# Patient Record
Sex: FEMALE | Race: WHITE | NOT HISPANIC OR LATINO | Employment: UNEMPLOYED | URBAN - METROPOLITAN AREA
[De-identification: names, ages, dates, MRNs, and addresses within clinical notes are randomized per-mention and may not be internally consistent; named-entity substitution may affect disease eponyms.]

---

## 2024-01-29 PROBLEM — E66.812 CLASS 2 OBESITY: Status: ACTIVE | Noted: 2024-01-29

## 2024-06-06 ENCOUNTER — TELEPHONE (OUTPATIENT)
Dept: SLEEP MEDICINE | Facility: HOSPITAL | Age: 59
End: 2024-06-06
Payer: COMMERCIAL

## 2024-06-06 ENCOUNTER — OFFICE VISIT (OUTPATIENT)
Dept: FAMILY MEDICINE CLINIC | Facility: CLINIC | Age: 59
End: 2024-06-06
Payer: COMMERCIAL

## 2024-06-06 VITALS
TEMPERATURE: 98.4 F | BODY MASS INDEX: 39.41 KG/M2 | HEART RATE: 86 BPM | HEIGHT: 66 IN | SYSTOLIC BLOOD PRESSURE: 118 MMHG | OXYGEN SATURATION: 99 % | DIASTOLIC BLOOD PRESSURE: 74 MMHG | WEIGHT: 245.2 LBS

## 2024-06-06 DIAGNOSIS — Z76.89 ENCOUNTER FOR SUPPORT AND COORDINATION OF TRANSITION OF CARE: Primary | ICD-10-CM

## 2024-06-06 DIAGNOSIS — Z00.00 ANNUAL PHYSICAL EXAM: ICD-10-CM

## 2024-06-06 DIAGNOSIS — R06.09 DYSPNEA ON EXERTION: ICD-10-CM

## 2024-06-06 DIAGNOSIS — Z12.31 ENCOUNTER FOR SCREENING MAMMOGRAM FOR MALIGNANT NEOPLASM OF BREAST: ICD-10-CM

## 2024-06-06 DIAGNOSIS — R60.0 EDEMA OF BOTH FEET: ICD-10-CM

## 2024-06-06 DIAGNOSIS — Z23 NEED FOR SHINGLES VACCINE: ICD-10-CM

## 2024-06-06 DIAGNOSIS — Z83.49 FAMILY HISTORY OF THYROID DISEASE: ICD-10-CM

## 2024-06-06 DIAGNOSIS — F51.04 CHRONIC INSOMNIA: ICD-10-CM

## 2024-06-06 DIAGNOSIS — F51.01 PRIMARY INSOMNIA: ICD-10-CM

## 2024-06-06 DIAGNOSIS — I51.89 DIASTOLIC DYSFUNCTION: ICD-10-CM

## 2024-06-06 DIAGNOSIS — Z51.81 MEDICATION MONITORING ENCOUNTER: ICD-10-CM

## 2024-06-06 DIAGNOSIS — I10 PRIMARY HYPERTENSION: ICD-10-CM

## 2024-06-06 DIAGNOSIS — R63.5 WEIGHT GAIN: ICD-10-CM

## 2024-06-06 LAB
AMPHET+METHAMPHET UR QL: NEGATIVE
AMPHETAMINE INTERNAL CONTROL: ABNORMAL
AMPHETAMINES UR QL: NEGATIVE
BARBITURATE INTERNAL CONTROL: ABNORMAL
BARBITURATES UR QL SCN: NEGATIVE
BENZODIAZ UR QL SCN: POSITIVE
BENZODIAZEPINE INTERNAL CONTROL: ABNORMAL
BUPRENORPHINE INTERNAL CONTROL: ABNORMAL
BUPRENORPHINE SERPL-MCNC: NEGATIVE NG/ML
CANNABINOIDS SERPL QL: NEGATIVE
COCAINE INTERNAL CONTROL: ABNORMAL
COCAINE UR QL: NEGATIVE
EXPIRATION DATE: ABNORMAL
Lab: ABNORMAL
MDMA (ECSTASY) INTERNAL CONTROL: ABNORMAL
MDMA UR QL SCN: NEGATIVE
METHADONE INTERNAL CONTROL: ABNORMAL
METHADONE UR QL SCN: NEGATIVE
METHAMPHETAMINE INTERNAL CONTROL: ABNORMAL
MORPHINE INTERNAL CONTROL: ABNORMAL
MORPHINE/OPIATES SCREEN, URINE: NEGATIVE
OXYCODONE INTERNAL CONTROL: ABNORMAL
OXYCODONE UR QL SCN: NEGATIVE
PCP UR QL SCN: NEGATIVE
PHENCYCLIDINE INTERNAL CONTROL: ABNORMAL
THC INTERNAL CONTROL: ABNORMAL

## 2024-06-06 PROCEDURE — 99396 PREV VISIT EST AGE 40-64: CPT

## 2024-06-06 PROCEDURE — 99214 OFFICE O/P EST MOD 30 MIN: CPT

## 2024-06-06 RX ORDER — TEMAZEPAM 15 MG/1
15 CAPSULE ORAL NIGHTLY
Qty: 30 CAPSULE | Refills: 2 | Status: SHIPPED | OUTPATIENT
Start: 2024-06-06

## 2024-06-06 RX ORDER — HYDROCHLOROTHIAZIDE 12.5 MG/1
12.5 TABLET ORAL DAILY
Qty: 90 TABLET | Refills: 0 | Status: SHIPPED | OUTPATIENT
Start: 2024-06-06

## 2024-06-06 NOTE — PROGRESS NOTES
Chief Complaint  Chief Complaint   Patient presents with    [facility] [place] Care    Annual Exam       Subjective      Carlota RODRIGUEZ presents to [facility]  History of Present Illness  The patient presents for evaluation of multiple medical concerns.    The patient has recently transitioned to [facility]. She has a medical history of hypertension and is currently on losartan 75 mg (taking a 50 mg and 25 mg tablet). She occasionally experiences dyspnea on exertion, which she attributes to her weight. Her mother has observed her breathing heavily during exertion. She has not utilized a CPAP machine for several years, but previously required it due to sleep disturbances. Her sleep quality has improved since discontinuing the CPAP. She underwent a sleep study in 10/2023 or 11/2023, which reportedly yielded normal results. She reports significant ankle edema, which intensifies when she consumes salty foods, leading her to avoid them. The swelling worsens at the end of the day, but decreases when she reduces her salt intake and elevates her legs. She underwent an echocardiogram and a Holter monitor at the end of 2020, both of which returned normal results. She has tried Weight Watchers and other weight loss methods, but these have not yielded positive results. She experienced a weight loss of 10 pounds during a divorce, from 250 pounds to 240 pounds, but has since regained some weight. She underwent cardiac testing due to chest pain and palpitations, which were normal. She takes Wellbutrin 1 tablet daily for depression and anxiety, but has not noticed a change in her appetite since starting Wellbutrin. She is unable to take Topamax due to a rash. She has no history of diabetes. She is due for a mammogram.   She works for [employer] and works from home for 20 hours a week.    Her mother retains a lot of water and has to take medicine for that. Her mother is going back to the      Objective     Medical History:  Past Medical History:   Diagnosis Date    Anemia     Anxiety     Asthma     Cholelithiasis     Had Gallbladder Removed    CPAP (continuous positive airway pressure) dependence     Depression     Eating disorder     Overweight    H/O Lymphedema     Left arm and leg    History of anemia     History of foreign travel     April and Luke    History of hypoglycemia     History of vitamin D deficiency     Hypertension     Knee meniscus pain, left     Migraine     OAB (overactive bladder)     Obesity     RICHARD (obstructive sleep apnea)     Poor vision      Past Surgical History:   Procedure Laterality Date     SECTION      CHOLECYSTECTOMY      COLONOSCOPY N/A 2023    Procedure: COLONOSCOPY with stool collection;  Surgeon: Leobardo Hodgson MD;  Location: [facility] ENDOSCOPY;  Service: General;  Laterality: N/A;  internal hemmorhoids    DILATATION AND CURETTAGE      ENDOMETRIAL ABLATION      HEMORRHOIDECTOMY  2006    LYMPH NODE DISSECTION      NECK SURGERY  1964    ROTATOR CUFF REPAIR Right 2010    SHOULDER SURGERY Left 2018      Social History     Tobacco Use    Smoking status: Never     Passive exposure: Never    Smokeless tobacco: Never   Vaping Use    Vaping status: Never Used   Substance Use Topics    Alcohol use: No    Drug use: No     Family History   Problem Relation Age of Onset    Clotting disorder Father     Hearing loss Father         Wore a Hearing Aid    Diabetes Mother     Skin cancer Mother     Anxiety disorder Mother         Anxiety    Arthritis Mother         Rheumatoid    Asthma Mother     Depression Mother     Hyperlipidemia Mother     Hearing loss Mother         Wears a Hearing Aid    Hypertension Mother     Anxiety disorder Maternal Grandmother         Anxiety    Diabetes Maternal Aunt     Breast cancer Maternal Aunt     Breast cancer Maternal Aunt     Anxiety disorder Maternal Aunt         Anxiety    Diabetes Maternal Aunt     Hyperlipidemia  Maternal Aunt     Cancer Maternal Aunt         Breast    Anxiety disorder Maternal Aunt         Anxiety    Hyperlipidemia Maternal Aunt     Uterine cancer Maternal Aunt     Cancer Maternal Aunt         Generalized    Hyperlipidemia Maternal Uncle     Diabetes Other     Hyperlipidemia Other     Hypertension Other     Lung cancer Other     Diabetes Paternal Grandmother        Medications:  Prior to Admission medications    Medication Sig Start Date End Date Taking? Authorizing Provider   buPROPion XL (WELLBUTRIN XL) 150 MG 24 hr tablet Take 1 tablet by mouth Daily. 5/1/24  Yes Betzy Cabral APRN   hydrocortisone 2.5 % cream Apply 1 application  topically to the appropriate area as directed 2 (Two) Times a Day.  Patient taking differently: Apply 1 Application topically to the appropriate area as directed As Needed. 11/17/23  Yes Bernardino Boland MD   losartan (COZAAR) 25 MG tablet Take 1 tablet by mouth Daily for 180 days. 5/1/24 10/28/24 Yes Betzy Cabral APRN   losartan (COZAAR) 50 MG tablet Take 1 tablet by mouth Daily for 180 days. 5/1/24 10/28/24 Yes Betzy Cabral APRN   temazepam (RESTORIL) 15 MG capsule Take 1 capsule by mouth Every Night. 1/29/24  Yes Carl Darling MD   triamcinolone (KENALOG) 0.1 % ointment APPLY TOPICALLY TO THE AFFECTED AREA TWICE DAILY AS DIRECTED 3/21/24  Yes Yue Pena APRN   meloxicam (MOBIC) 15 MG tablet TAKE 1 TABLET BY MOUTH DAILY  Patient not taking: Reported on 6/6/2024 5/17/24   Bernardino Boland MD   lisinopril (PRINIVIL,ZESTRIL) 10 MG tablet Take 1 tablet by mouth Daily. 8/24/21 5/9/22  Ruchi Bradford APRN        Allergies:   Codeine, Demerol [meperidine], Hydrocodone-acetaminophen, Percocet [oxycodone-acetaminophen], Topiramate, Tramadol, and Tylenol [acetaminophen]    Health Maintenance Due   Topic Date Due    ZOSTER VACCINE (2 of 3) 08/01/2014    ANNUAL PHYSICAL  Never done         Vital Signs:   /74 (BP Location:  "Left arm, Patient Position: Sitting, Cuff Size: Adult)   Pulse 86   Temp 98.4 °F (36.9 °C)   Ht 167.6 cm (65.98\")   Wt 111 kg (245 lb 3.2 oz)   SpO2 99%   BMI 39.60 kg/m²     Wt Readings from Last 3 Encounters:   06/06/24 111 kg (245 lb 3.2 oz)   04/10/24 109 kg (240 lb)   02/28/24 114 kg (252 lb 6.4 oz)     BP Readings from Last 3 Encounters:   06/06/24 118/74   04/10/24 134/82   02/28/24 137/81     Physical Exam  Vitals reviewed.   Constitutional:       Appearance: Normal appearance. She is well-developed. She is obese.   HENT:      Head: Normocephalic and atraumatic.   Eyes:      Conjunctiva/sclera: Conjunctivae normal.      Pupils: Pupils are equal, round, and reactive to light.   Cardiovascular:      Rate and Rhythm: Normal rate and regular rhythm.      Heart sounds: No murmur heard.     No friction rub. No gallop.      Comments: Trace edema to lower extremities, no pitting edema noted on today's exam.  Pulmonary:      Effort: Pulmonary effort is normal.      Breath sounds: Normal breath sounds. No wheezing or rhonchi.   Abdominal:      General: Bowel sounds are normal. There is no distension.      Palpations: Abdomen is soft.      Tenderness: There is no abdominal tenderness.   Skin:     General: Skin is warm and dry.   Neurological:      Mental Status: She is alert and oriented to person, place, and time.      Cranial Nerves: No cranial nerve deficit.   Psychiatric:         Mood and Affect: Mood and affect normal.         Behavior: Behavior normal.         Thought Content: Thought content normal.         Judgment: Judgment normal.       Physical Exam  Lungs sound good. No wheezes or crackles in the bases.      Result Review :    The following data was reviewed by ARVIND Cruz on 06/06/24 at 11:51 EDT:               Results                 Assessment and Plan    Diagnoses and all orders for this visit:    1. Encounter for support and coordination of transition of care (Primary)    2. " Annual physical exam  -     CBC & Differential  -     Comprehensive Metabolic Panel  -     Lipid Panel  -     TSH Rfx On Abnormal To Free T4    3. Dyspnea on exertion    4. Edema of both feet  -     hydroCHLOROthiazide 12.5 MG tablet; Take 1 tablet by mouth Daily.  Dispense: 90 tablet; Refill: 0    5. Diastolic dysfunction    6. Family history of thyroid disease  -     TSH Rfx On Abnormal To Free T4  -     Thyroid Peroxidase Antibody    7. Weight gain    8. Primary hypertension  -     hydroCHLOROthiazide 12.5 MG tablet; Take 1 tablet by mouth Daily.  Dispense: 90 tablet; Refill: 0    9. Encounter for screening mammogram for malignant neoplasm of breast  -     Mammo Screening Digital Tomosynthesis Bilateral With CAD; Future    10. Need for shingles vaccine    11. Primary insomnia  -     POC Medline 12 Panel Urine Drug Screen    12. Medication monitoring encounter  -     POC Medline 12 Panel Urine Drug Screen    13. Chronic insomnia  -     temazepam (RESTORIL) 15 MG capsule; Take 1 capsule by mouth Every Night.  Dispense: 30 capsule; Refill: 2       Assessment & Plan  1. Hypertension/edema.  A comprehensive set of labs will be conducted to assess kidney and liver function, blood counts, thyroid function, and cholesterol levels. Hydrochlorothiazide 12.5 mg once daily will be initiated for management of edema and hypertension.  Patient encouraged to monitor her blood pressure regularly at home to ensure that the addition of HCTZ does not cause her blood pressure to drop significantly.  If so, losartan may need to be decreased to 50 mg daily.    2.  Insomnia  Patient to continue temazepam as needed for insomnia.  A urine drug screen and controlled substance agreement form will be obtained today.    3.  Weight gain/obesity  Lower panel to be obtained today to rule out underlying thyroid disease.  Discussed multiple weight loss medications.  Phentermine will be contraindicated given history of hypertension, grade 1  diastolic dysfunction.  Patient is on Wellbutrin 150 mg daily now for mood.  She has a contraindication to Topamax as she had an adverse reaction when she previously took this for headaches.  Discussed injectable medications such as GLP-1's and patient is to contact her insurance to determine if they cover any weight loss medications.    4.  Health maintenance  A mammogram will be ordered.  Patient encouraged to receive RSV and shingles vaccines at her pharmacy.    Risk and benefits for use of benzodiazepines were discussed with the patient including risk of dependence/abuse.  Adverse effects discussed with patient including drowsiness and decreased respiratory drive especially when combining with alcohol and/or opiates need for routine monitoring discussed.    Follow-up  A follow-up appointment is scheduled for one month from now to monitor blood pressure and edema with initiation of HCTZ.          Smoking Cessation:    Carlota RODRIGUEZ  reports that she has never smoked. She has never been exposed to tobacco smoke. She has never used smokeless tobacco.          Follow Up   Return in about 1 month (around 7/6/2024).  Patient was given instructions and counseling regarding her condition or for health maintenance advice. Please see specific information pulled into the AVS if appropriate.     Please note that portions of this note were completed with a voice recognition program.    Patient or patient representative verbalized consent for the use of Ambient Listening during the visit with  ARVIND Cruz for chart documentation. 6/6/2024  11:47 EDT

## 2024-06-07 ENCOUNTER — CLINICAL SUPPORT (OUTPATIENT)
Dept: FAMILY MEDICINE CLINIC | Facility: CLINIC | Age: 59
End: 2024-06-07
Payer: COMMERCIAL

## 2024-06-07 DIAGNOSIS — I10 PRIMARY HYPERTENSION: Primary | ICD-10-CM

## 2024-06-07 LAB
ALBUMIN SERPL-MCNC: 4.2 G/DL (ref 3.5–5.2)
ALBUMIN/GLOB SERPL: 1.6 G/DL
ALP SERPL-CCNC: 119 U/L (ref 39–117)
ALT SERPL W P-5'-P-CCNC: 24 U/L (ref 1–33)
ANION GAP SERPL CALCULATED.3IONS-SCNC: 11 MMOL/L (ref 5–15)
AST SERPL-CCNC: 19 U/L (ref 1–32)
BASOPHILS # BLD AUTO: 0.08 10*3/MM3 (ref 0–0.2)
BASOPHILS NFR BLD AUTO: 1.6 % (ref 0–1.5)
BILIRUB SERPL-MCNC: 0.3 MG/DL (ref 0–1.2)
BUN SERPL-MCNC: 14 MG/DL (ref 8–23)
BUN/CREAT SERPL: 14.6 (ref 7–25)
CALCIUM SPEC-SCNC: 9.2 MG/DL (ref 8.6–10.5)
CHLORIDE SERPL-SCNC: 107 MMOL/L (ref 98–107)
CHOLEST SERPL-MCNC: 204 MG/DL (ref 0–200)
CO2 SERPL-SCNC: 21 MMOL/L (ref 22–29)
CREAT SERPL-MCNC: 0.96 MG/DL (ref 0.57–1)
DEPRECATED RDW RBC AUTO: 38.9 FL (ref 37–54)
EGFRCR SERPLBLD CKD-EPI 2021: 67.9 ML/MIN/1.73
EOSINOPHIL # BLD AUTO: 0.13 10*3/MM3 (ref 0–0.4)
EOSINOPHIL NFR BLD AUTO: 2.7 % (ref 0.3–6.2)
ERYTHROCYTE [DISTWIDTH] IN BLOOD BY AUTOMATED COUNT: 12.5 % (ref 12.3–15.4)
GLOBULIN UR ELPH-MCNC: 2.7 GM/DL
GLUCOSE SERPL-MCNC: 92 MG/DL (ref 65–99)
HCT VFR BLD AUTO: 39.3 % (ref 34–46.6)
HDLC SERPL-MCNC: 65 MG/DL (ref 40–60)
HGB BLD-MCNC: 12.9 G/DL (ref 12–15.9)
IMM GRANULOCYTES # BLD AUTO: 0.01 10*3/MM3 (ref 0–0.05)
IMM GRANULOCYTES NFR BLD AUTO: 0.2 % (ref 0–0.5)
LDLC SERPL CALC-MCNC: 116 MG/DL (ref 0–100)
LDLC/HDLC SERPL: 1.74 {RATIO}
LYMPHOCYTES # BLD AUTO: 1.79 10*3/MM3 (ref 0.7–3.1)
LYMPHOCYTES NFR BLD AUTO: 36.7 % (ref 19.6–45.3)
MCH RBC QN AUTO: 28.2 PG (ref 26.6–33)
MCHC RBC AUTO-ENTMCNC: 32.8 G/DL (ref 31.5–35.7)
MCV RBC AUTO: 86 FL (ref 79–97)
MONOCYTES # BLD AUTO: 0.39 10*3/MM3 (ref 0.1–0.9)
MONOCYTES NFR BLD AUTO: 8 % (ref 5–12)
NEUTROPHILS NFR BLD AUTO: 2.48 10*3/MM3 (ref 1.7–7)
NEUTROPHILS NFR BLD AUTO: 50.8 % (ref 42.7–76)
NRBC BLD AUTO-RTO: 0 /100 WBC (ref 0–0.2)
PLATELET # BLD AUTO: 271 10*3/MM3 (ref 140–450)
PMV BLD AUTO: 10.7 FL (ref 6–12)
POTASSIUM SERPL-SCNC: 4 MMOL/L (ref 3.5–5.2)
PROT SERPL-MCNC: 6.9 G/DL (ref 6–8.5)
RBC # BLD AUTO: 4.57 10*6/MM3 (ref 3.77–5.28)
SODIUM SERPL-SCNC: 139 MMOL/L (ref 136–145)
TRIGL SERPL-MCNC: 130 MG/DL (ref 0–150)
TSH SERPL DL<=0.05 MIU/L-ACNC: 3.6 UIU/ML (ref 0.27–4.2)
VLDLC SERPL-MCNC: 23 MG/DL (ref 5–40)
WBC NRBC COR # BLD AUTO: 4.88 10*3/MM3 (ref 3.4–10.8)

## 2024-06-07 PROCEDURE — 80050 GENERAL HEALTH PANEL: CPT

## 2024-06-07 PROCEDURE — 86376 MICROSOMAL ANTIBODY EACH: CPT

## 2024-06-07 PROCEDURE — 80061 LIPID PANEL: CPT

## 2024-06-07 PROCEDURE — 36415 COLL VENOUS BLD VENIPUNCTURE: CPT

## 2024-06-09 LAB — THYROPEROXIDASE AB SERPL-ACNC: 10 IU/ML (ref 0–34)

## 2024-06-20 ENCOUNTER — TELEPHONE (OUTPATIENT)
Dept: SLEEP MEDICINE | Facility: HOSPITAL | Age: 59
End: 2024-06-20
Payer: COMMERCIAL

## 2024-06-21 ENCOUNTER — HOSPITAL ENCOUNTER (OUTPATIENT)
Dept: MAMMOGRAPHY | Facility: HOSPITAL | Age: 59
Discharge: HOME | End: 2024-06-21
Payer: COMMERCIAL

## 2024-06-21 DIAGNOSIS — Z12.31 ENCOUNTER FOR SCREENING MAMMOGRAM FOR MALIGNANT NEOPLASM OF BREAST: ICD-10-CM

## 2024-06-21 PROCEDURE — 77063 BREAST TOMOSYNTHESIS BI: CPT

## 2024-06-21 PROCEDURE — 77067 SCR MAMMO BI INCL CAD: CPT

## 2024-07-05 ENCOUNTER — OFFICE VISIT (OUTPATIENT)
Dept: FAMILY MEDICINE CLINIC | Facility: CLINIC | Age: 59
End: 2024-07-05
Payer: COMMERCIAL

## 2024-07-05 VITALS
TEMPERATURE: 98.2 F | SYSTOLIC BLOOD PRESSURE: 112 MMHG | HEART RATE: 80 BPM | WEIGHT: 245.2 LBS | BODY MASS INDEX: 39.41 KG/M2 | DIASTOLIC BLOOD PRESSURE: 76 MMHG | HEIGHT: 66 IN | OXYGEN SATURATION: 98 %

## 2024-07-05 DIAGNOSIS — I10 PRIMARY HYPERTENSION: Primary | ICD-10-CM

## 2024-07-05 DIAGNOSIS — R06.09 DYSPNEA ON EXERTION: ICD-10-CM

## 2024-07-05 DIAGNOSIS — F51.01 PRIMARY INSOMNIA: ICD-10-CM

## 2024-07-05 DIAGNOSIS — S16.1XXD STRAIN OF NECK MUSCLE, SUBSEQUENT ENCOUNTER: ICD-10-CM

## 2024-07-05 DIAGNOSIS — R60.0 EDEMA OF BOTH FEET: ICD-10-CM

## 2024-07-05 RX ORDER — FUROSEMIDE 20 MG/1
20 TABLET ORAL DAILY
Qty: 30 TABLET | Refills: 2 | Status: SHIPPED | OUTPATIENT
Start: 2024-07-05

## 2024-07-05 RX ORDER — METHOCARBAMOL 500 MG/1
500 TABLET, FILM COATED ORAL 4 TIMES DAILY
Qty: 28 TABLET | Refills: 0 | Status: SHIPPED | OUTPATIENT
Start: 2024-07-05 | End: 2024-07-12

## 2024-07-05 NOTE — PROGRESS NOTES
Chief Complaint  Chief Complaint   Patient presents with    Hypertension    Edema     Pt was prescribed HCTZ at last office visit. Pt states she took it for 3 weeks and had to discontinue as she was having dizziness and weakness. Pt still swelling.    Neck Pain     Pt states she strained the muscles in the right side of her neck since before last appt. She is still experiencing pain.       Subjective      Carlota RODRIGUEZ presents to [facility]  History of Present Illness  The patient presents for follow-up on blood pressure and swelling.    The patient discontinued the use of HCTZ a few weeks ago due to episodes of dizziness and weakness.  She decreased her dose of losartan but then experienced elevated blood pressure so resumed her usual dose of 75 mg daily.  She discontinued the HCTZ and reported feeling better, albeit with persistent ankle swelling. She perceives the HCTZ to be ineffective, as she did not experience increased urination. She purchased an ankle bracelet and noticed an increase in swelling upon discontinuation of the medication. The swelling tends to worsen at the end of the day. She has not taken any other diuretics in the past. This morning, she administered losartan 25 mg and 50 mg, and Wellbutrin 150 mg. She occasionally experiences shortness of breath, particularly during physical activity. Her last echocardiogram was conducted in 2020 which showed normal heart function.  Blood pressure is appropriate today at 112/76.    The patient reported waking up with neck pain a week prior to her last visit, which she attributes to improper sleeping position. Despite performing stretching exercises, the pain persists. She has previously tried Flexeril, which proved effective, but caused severe hangovers the following day. 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 She has not used a heating pad, but has used  Biofreeze.      Objective     Medical History:  Past Medical History:   Diagnosis Date    Anemia     Anxiety     Asthma     Cholelithiasis     Had Gallbladder Removed    CPAP (continuous positive airway pressure) dependence     Depression     Eating disorder     Overweight    H/O Lymphedema     Left arm and leg    History of anemia     History of foreign travel     April and Luke    History of hypoglycemia     History of vitamin D deficiency     Hypertension     Knee meniscus pain, left     Migraine     OAB (overactive bladder)     Obesity     RICHARD (obstructive sleep apnea)     Poor vision      Past Surgical History:   Procedure Laterality Date     SECTION      CHOLECYSTECTOMY      COLONOSCOPY N/A 2023    Procedure: COLONOSCOPY with stool collection;  Surgeon: Leobardo Hodgson MD;  Location: [facility] ENDOSCOPY;  Service: General;  Laterality: N/A;  internal hemmorhoids    DILATATION AND CURETTAGE      ENDOMETRIAL ABLATION      HEMORRHOIDECTOMY  2006    LYMPH NODE DISSECTION      NECK SURGERY  1964    ROTATOR CUFF REPAIR Right 2010    SHOULDER SURGERY Left 2018      Social History     Tobacco Use    Smoking status: Never     Passive exposure: Never    Smokeless tobacco: Never   Vaping Use    Vaping status: Never Used   Substance Use Topics    Alcohol use: No    Drug use: No     Family History   Problem Relation Age of Onset    Clotting disorder Father     Hearing loss Father         Wore a Hearing Aid    Diabetes Mother     Skin cancer Mother     Anxiety disorder Mother         Anxiety    Arthritis Mother         Rheumatoid    Asthma Mother     Depression Mother     Hyperlipidemia Mother     Hearing loss Mother         Wears a Hearing Aid    Hypertension Mother     Anxiety disorder Maternal Grandmother         Anxiety    Diabetes Maternal Aunt     Breast cancer Maternal Aunt     Breast cancer Maternal Aunt     Anxiety disorder Maternal Aunt         Anxiety    Diabetes Maternal Aunt      Hyperlipidemia Maternal Aunt     Cancer Maternal Aunt         Breast    Anxiety disorder Maternal Aunt         Anxiety    Hyperlipidemia Maternal Aunt     Uterine cancer Maternal Aunt     Cancer Maternal Aunt         Generalized    Hyperlipidemia Maternal Uncle     Diabetes Other     Hyperlipidemia Other     Hypertension Other     Lung cancer Other     Diabetes Paternal Grandmother        Medications:  Prior to Admission medications    Medication Sig Start Date End Date Taking? Authorizing Provider   buPROPion XL (WELLBUTRIN XL) 150 MG 24 hr tablet Take 1 tablet by mouth Daily. 5/1/24  Yes Betzy Cabral APRN   hydrocortisone 2.5 % cream Apply 1 application  topically to the appropriate area as directed 2 (Two) Times a Day.  Patient taking differently: Apply 1 Application topically to the appropriate area as directed As Needed. 11/17/23  Yes Bernardino Boland MD   losartan (COZAAR) 25 MG tablet Take 1 tablet by mouth Daily for 180 days. 5/1/24 10/28/24 Yes Betzy Cabral APRN   losartan (COZAAR) 50 MG tablet Take 1 tablet by mouth Daily for 180 days. 5/1/24 10/28/24 Yes Betzy Cabral APRN   temazepam (RESTORIL) 15 MG capsule Take 1 capsule by mouth Every Night. 6/6/24  Yes Betzy Cabral APRN   triamcinolone (KENALOG) 0.1 % ointment APPLY TOPICALLY TO THE AFFECTED AREA TWICE DAILY AS DIRECTED 3/21/24  Yes Yue Pena, RICHARD   hydroCHLOROthiazide 12.5 MG tablet Take 1 tablet by mouth Daily. 6/6/24   Betzy Cabral APRN   lisinopril (PRINIVIL,ZESTRIL) 10 MG tablet Take 1 tablet by mouth Daily. 8/24/21 5/9/22  Ruchi Bradford APRN        Allergies:   Codeine, Demerol [meperidine], Hydrocodone-acetaminophen, Percocet [oxycodone-acetaminophen], Topiramate, Tramadol, and Tylenol [acetaminophen]    Health Maintenance Due   Topic Date Due    ZOSTER VACCINE (2 of 3) 08/01/2014    COVID-19 Vaccine (1 - 2023-24 season) Never done         Vital Signs:   /76 (BP Location:  "Right arm, Patient Position: Sitting, Cuff Size: Adult)   Pulse 80   Temp 98.2 °F (36.8 °C) (Oral)   Ht 167.6 cm (65.98\")   Wt 111 kg (245 lb 3.2 oz)   SpO2 98%   BMI 39.60 kg/m²     Wt Readings from Last 3 Encounters:   24 111 kg (245 lb 3.2 oz)   24 111 kg (245 lb 3.2 oz)   04/10/24 109 kg (240 lb)     BP Readings from Last 3 Encounters:   24 112/76   24 118/74   04/10/24 134/82     Physical Exam  Vitals reviewed.   Constitutional:       Appearance: Normal appearance. She is well-developed.   HENT:      Head: Normocephalic and atraumatic.   Eyes:      Conjunctiva/sclera: Conjunctivae normal.      Pupils: Pupils are equal, round, and reactive to light.   Cardiovascular:      Rate and Rhythm: Normal rate and regular rhythm.      Heart sounds: No murmur heard.     No friction rub. No gallop.   Pulmonary:      Effort: Pulmonary effort is normal.      Breath sounds: Normal breath sounds. No wheezing or rhonchi.   Abdominal:      General: Bowel sounds are normal. There is no distension.      Palpations: Abdomen is soft.      Tenderness: There is no abdominal tenderness.   Musculoskeletal:      Cervical back: No edema or rigidity. Pain with movement and muscular tenderness present. Normal range of motion.      Right lower le+ Pitting Edema present.      Left lower le+ Pitting Edema present.   Skin:     General: Skin is warm and dry.   Neurological:      Mental Status: She is alert and oriented to person, place, and time.      Cranial Nerves: No cranial nerve deficit.   Psychiatric:         Mood and Affect: Mood and affect normal.         Behavior: Behavior normal.         Thought Content: Thought content normal.         Judgment: Judgment normal.       Physical Exam  Heart sounds are normal.    Vital Signs  Blood pressure measures 112/76.      Result Review :    The following data was reviewed by ARVIND Cruz on 24 at 10:05 EDT:    Common labs          2024 "    12:55   Common Labs   Glucose 92    BUN 14    Creatinine 0.96    Sodium 139    Potassium 4.0    Chloride 107    Calcium 9.2    Albumin 4.2    Total Bilirubin 0.3    Alkaline Phosphatase 119    AST (SGOT) 19    ALT (SGPT) 24    WBC 4.88    Hemoglobin 12.9    Hematocrit 39.3    Platelets 271    Total Cholesterol 204    Triglycerides 130    HDL Cholesterol 65    LDL Cholesterol  116        Mammo Screening Digital Tomosynthesis Bilateral With CAD    Result Date: 6/21/2024  Benign findings. No mammographic evidence of malignancy. Recommend routine mammographic screening.  BI-RADS ASSESSMENT: BI-RADS 2. Benign findings.  The patient's information is entered into a computerized reminder system with a targeted due date for the next mammogram.  Note:  It has been reported that there is approximately a 15% false negative rate in mammography.  Therefore, management of a palpable abnormality should not be deferred because of a negative mammogram.     Electronically Signed By-JUAN HAMPTON MD On:6/21/2024 3:17 PM        Results                 Assessment and Plan    Diagnoses and all orders for this visit:    1. Primary hypertension (Primary)  -     furosemide (Lasix) 20 MG tablet; Take 1 tablet by mouth Daily.  Dispense: 30 tablet; Refill: 2  -     Adult Transthoracic Echo Complete W/ Cont if Necessary Per Protocol; Future    2. Dyspnea on exertion  -     furosemide (Lasix) 20 MG tablet; Take 1 tablet by mouth Daily.  Dispense: 30 tablet; Refill: 2  -     Adult Transthoracic Echo Complete W/ Cont if Necessary Per Protocol; Future    3. Edema of both feet  -     furosemide (Lasix) 20 MG tablet; Take 1 tablet by mouth Daily.  Dispense: 30 tablet; Refill: 2  -     Adult Transthoracic Echo Complete W/ Cont if Necessary Per Protocol; Future    4. Strain of neck muscle, subsequent encounter  -     methocarbamol (ROBAXIN) 500 MG tablet; Take 1 tablet by mouth 4 (Four) Times a Day for 7 days.  Dispense: 28 tablet; Refill: 0    5.  Primary insomnia       Assessment & Plan  1. Hypertension/edema/dyspnea on exertion.  The patient's blood pressure reading today is 112/76. The dosage of losartan will be reduced from 75 mg to 50 mg, to be taken in conjunction with Lasix 20 mg in the morning. Should the patient experience weakness, fatigue, or dizziness, the 50 mg dosage will be reduced to 25 mg. She is advised to monitor her blood pressure at home, particularly in the morning and again in the afternoon/evening. An echocardiogram has been ordered as most recent one was 4 years ago and she has recently had worsening of edema to the lower extremities and dyspnea with exertion.  Discussed with patient that goal blood pressure is 1 10-1 20 over 70s.  If ranging 130s to 140s over 80s, continue medication regimen unless blood pressure continues to rise.  If systolic blood pressure is as high as 180-190 she has been advised to report to the emergency room if she has any symptoms of headaches, blurred vision, chest pain.  Otherwise, increase losartan to previous dose.  Patient to follow-up in 1 month to monitor blood pressure and swelling.  If blood pressure stabilizes and remains at goal between 110-120 systolic and 70s diastolic, will continue Lasix 20 mg once daily and losartan 50 mg once daily.    2. Neck pain.  Methocarbamol has been prescribed for use as needed.  Patient was advised to use a heating pad as needed.    Follow-up  A follow-up appointment is scheduled for 1 month from now.          Smoking Cessation:    Carlota RODRIGUEZ  reports that she has never smoked. She has never been exposed to tobacco smoke. She has never used smokeless tobacco.           Follow Up   Return in about 1 month (around 8/5/2024) for Next scheduled follow up.  Patient was given instructions and counseling regarding her condition or for health maintenance advice. Please see specific information pulled into the AVS if appropriate.     Please note that portions of this note  were completed with a voice recognition program.    Patient or patient representative verbalized consent for the use of Ambient Listening during the visit with  ARVIND Cruz for chart documentation. 7/5/2024  10:03 EDT

## 2024-07-08 DIAGNOSIS — R60.0 EDEMA OF BOTH FEET: ICD-10-CM

## 2024-07-08 DIAGNOSIS — I10 PRIMARY HYPERTENSION: ICD-10-CM

## 2024-07-08 RX ORDER — HYDROCHLOROTHIAZIDE 12.5 MG/1
12.5 TABLET ORAL DAILY
Qty: 90 TABLET | Refills: 0 | Status: SHIPPED | OUTPATIENT
Start: 2024-07-08

## 2024-08-07 ENCOUNTER — OFFICE VISIT (OUTPATIENT)
Dept: FAMILY MEDICINE CLINIC | Facility: CLINIC | Age: 59
End: 2024-08-07
Payer: COMMERCIAL

## 2024-08-07 VITALS
TEMPERATURE: 98.4 F | WEIGHT: 239.3 LBS | BODY MASS INDEX: 38.46 KG/M2 | HEIGHT: 66 IN | DIASTOLIC BLOOD PRESSURE: 74 MMHG | HEART RATE: 79 BPM | OXYGEN SATURATION: 98 % | SYSTOLIC BLOOD PRESSURE: 118 MMHG

## 2024-08-07 DIAGNOSIS — I10 PRIMARY HYPERTENSION: Primary | ICD-10-CM

## 2024-08-07 DIAGNOSIS — L82.1 SEBORRHEIC KERATOSIS: ICD-10-CM

## 2024-08-07 PROCEDURE — 99214 OFFICE O/P EST MOD 30 MIN: CPT

## 2024-08-07 NOTE — PROGRESS NOTES
Chief Complaint  Chief Complaint   Patient presents with    Hypertension       Subjective      Carlota RODRIGUEZ presents to [facility]  History of Present Illness  The patient presents today to follow up on blood pressure. She had previously been experiencing some dizzy spells and had been adjusting her blood pressure medication dose. When seen, she was advised to continue losartan 50 mg daily and Lasix 20 mg daily. Echocardiogram was ordered at last visit 1 month ago, but not complete at this time. She also has concerns regarding some dry brown spots on her legs that are very itchy and bothersome.    She reports that her blood pressure readings at home are typically high, typically around 2:00 p.m. and 9:00 p.m. Both times, her blood pressure tends to be high. She recently purchased a new cuff as the old one feels too tight. Initially, she took 50 mg of blood pressure medication, which resulted in a blood pressure spike to 148/98 for two weeks. Consequently, she increased the dose to 25 mg, but her blood pressure remained high and even higher. She returned to taking 75 mg of blood pressure medication and taking Lasix 20 mg, which made her feel normal. She did not take her Lasix this morning. She denies experiencing dizziness, lightheadedness, headaches, or chest pain since discontinuing hydrochlorothiazide.    She has dry, scaly brown spots on her legs, which do not cause itching or pain. Despite trying various lotions, oils, hyaluronic acid, CeraVe, aloe, Aveeno body wash, and cuticle oil, the spots have not improved. She often picks at them at night while watching TV.      Objective     Medical History:  Past Medical History:   Diagnosis Date    Anemia     Anxiety     Asthma     Cholelithiasis     Had Gallbladder Removed    CPAP (continuous positive airway pressure) dependence     Depression     Eating disorder     Overweight    H/O Lymphedema     Left arm and leg    History of  anemia     History of foreign travel     April and [place]    History of hypoglycemia     History of vitamin D deficiency     Hypertension     Knee meniscus pain, left     Migraine     OAB (overactive bladder)     Obesity     RICHARD (obstructive sleep apnea)     Poor vision      Past Surgical History:   Procedure Laterality Date     SECTION      CHOLECYSTECTOMY      COLONOSCOPY N/A 2023    Procedure: COLONOSCOPY with stool collection;  Surgeon: Leobardo Hodgson MD;  Location: [facility] ENDOSCOPY;  Service: General;  Laterality: N/A;  internal hemmorhoids    DILATATION AND CURETTAGE      ENDOMETRIAL ABLATION      HEMORRHOIDECTOMY  2006    LYMPH NODE DISSECTION      NECK SURGERY  1964    ROTATOR CUFF REPAIR Right 2010    SHOULDER SURGERY Left 2018      Social History     Tobacco Use    Smoking status: Never     Passive exposure: Never    Smokeless tobacco: Never   Vaping Use    Vaping status: Never Used   Substance Use Topics    Alcohol use: No    Drug use: No     Family History   Problem Relation Age of Onset    Clotting disorder Father     Hearing loss Father         Wore a Hearing Aid    Diabetes Mother     Skin cancer Mother     Anxiety disorder Mother         Anxiety    Arthritis Mother         Rheumatoid    Asthma Mother     Depression Mother     Hyperlipidemia Mother     Hearing loss Mother         Wears a Hearing Aid    Hypertension Mother     Anxiety disorder Maternal Grandmother         Anxiety    Diabetes Maternal Aunt     Breast cancer Maternal Aunt     Breast cancer Maternal Aunt     Anxiety disorder Maternal Aunt         Anxiety    Diabetes Maternal Aunt     Hyperlipidemia Maternal Aunt     Cancer Maternal Aunt         Breast    Anxiety disorder Maternal Aunt         Anxiety    Hyperlipidemia Maternal Aunt     Uterine cancer Maternal Aunt     Cancer Maternal Aunt         Generalized    Hyperlipidemia Maternal Uncle     Diabetes Other     Hyperlipidemia Other     Hypertension Other      "Lung cancer Other     Diabetes Paternal Grandmother        Medications:  Prior to Admission medications    Medication Sig Start Date End Date Taking? Authorizing Provider   buPROPion XL (WELLBUTRIN XL) 150 MG 24 hr tablet Take 1 tablet by mouth Daily. 5/1/24  Yes Betzy Cabral APRN   furosemide (Lasix) 20 MG tablet Take 1 tablet by mouth Daily. 7/5/24  Yes Betzy Cabral APRN   hydroCHLOROthiazide 12.5 MG tablet TAKE 1 TABLET BY MOUTH DAILY 7/8/24  Yes Betzy Cabral APRN   hydrocortisone 2.5 % cream Apply 1 application  topically to the appropriate area as directed 2 (Two) Times a Day.  Patient taking differently: Apply 1 Application topically to the appropriate area as directed As Needed. 11/17/23  Yes Bernardino Boland MD   losartan (COZAAR) 25 MG tablet Take 1 tablet by mouth Daily for 180 days. 5/1/24 10/28/24 Yes Betzy Cabral APRN   losartan (COZAAR) 50 MG tablet Take 1 tablet by mouth Daily for 180 days. 5/1/24 10/28/24 Yes Betzy Cabral APRN   temazepam (RESTORIL) 15 MG capsule Take 1 capsule by mouth Every Night. 6/6/24  Yes Betzy Cabral APRN   triamcinolone (KENALOG) 0.1 % ointment APPLY TOPICALLY TO THE AFFECTED AREA TWICE DAILY AS DIRECTED 3/21/24  Yes Yue Pena, RICHARD   lisinopril (PRINIVIL,ZESTRIL) 10 MG tablet Take 1 tablet by mouth Daily. 8/24/21 5/9/22  Ruchi Bradford APRN        Allergies:   Codeine, Demerol [meperidine], Hydrocodone-acetaminophen, Percocet [oxycodone-acetaminophen], Topiramate, Tramadol, and Tylenol [acetaminophen]    Health Maintenance Due   Topic Date Due    ZOSTER VACCINE (2 of 3) 08/01/2014    COVID-19 Vaccine (1 - 2023-24 season) Never done    INFLUENZA VACCINE  08/01/2024         Vital Signs:   /74 (BP Location: Right arm, Patient Position: Sitting, Cuff Size: Adult)   Pulse 79   Temp 98.4 °F (36.9 °C) (Oral)   Ht 167.6 cm (65.98\")   Wt 109 kg (239 lb 4.8 oz)   SpO2 98%   BMI 38.65 kg/m²     Wt " Readings from Last 3 Encounters:   08/07/24 109 kg (239 lb 4.8 oz)   07/05/24 111 kg (245 lb 3.2 oz)   06/06/24 111 kg (245 lb 3.2 oz)     BP Readings from Last 3 Encounters:   08/07/24 118/74   07/05/24 112/76   06/06/24 118/74       Physical Exam  Vitals reviewed.   Constitutional:       Appearance: Normal appearance. She is well-developed.   HENT:      Head: Normocephalic and atraumatic.   Eyes:      Conjunctiva/sclera: Conjunctivae normal.      Pupils: Pupils are equal, round, and reactive to light.   Cardiovascular:      Rate and Rhythm: Normal rate and regular rhythm.      Heart sounds: No murmur heard.     No friction rub. No gallop.   Pulmonary:      Effort: Pulmonary effort is normal.      Breath sounds: Normal breath sounds. No wheezing or rhonchi.   Abdominal:      General: Bowel sounds are normal. There is no distension.      Palpations: Abdomen is soft.      Tenderness: There is no abdominal tenderness.   Skin:     General: Skin is warm and dry.      Comments: Multiple seborrheic keratosis lesions to bilateral upper legs with dry and scaling areas; no erythema or inflammation   Neurological:      Mental Status: She is alert and oriented to person, place, and time.      Cranial Nerves: No cranial nerve deficit.   Psychiatric:         Mood and Affect: Mood and affect normal.         Behavior: Behavior normal.         Thought Content: Thought content normal.         Judgment: Judgment normal.       Physical Exam  Vital Signs  Vitals show a blood pressure of 118/74.      Result Review :    The following data was reviewed by ARVIND Cruz on 08/07/24 at 10:03 EDT:    Common labs          6/7/2024    12:55   Common Labs   Glucose 92    BUN 14    Creatinine 0.96    Sodium 139    Potassium 4.0    Chloride 107    Calcium 9.2    Albumin 4.2    Total Bilirubin 0.3    Alkaline Phosphatase 119    AST (SGOT) 19    ALT (SGPT) 24    WBC 4.88    Hemoglobin 12.9    Hematocrit 39.3    Platelets 271    Total  Cholesterol 204    Triglycerides 130    HDL Cholesterol 65    LDL Cholesterol  116        Mammo Screening Digital Tomosynthesis Bilateral With CAD    Result Date: 6/21/2024  Benign findings. No mammographic evidence of malignancy. Recommend routine mammographic screening.  BI-RADS ASSESSMENT: BI-RADS 2. Benign findings.  The patient's information is entered into a computerized reminder system with a targeted due date for the next mammogram.  Note:  It has been reported that there is approximately a 15% false negative rate in mammography.  Therefore, management of a palpable abnormality should not be deferred because of a negative mammogram.     Electronically Signed By-JUAN HAMPTON MD On:6/21/2024 3:17 PM        Results                 Assessment and Plan    Diagnoses and all orders for this visit:    1. Primary hypertension (Primary)    2. Seborrheic keratosis       Assessment & Plan  1. Hypertension.  She was advised to bring her blood pressure cuff for a nurse visit to compare her blood pressure via automatic and manual cuff. The current medication regimen will be maintained, losartan 75 mg daily and Lasix 20 mg daily.    2. Seborrheic keratosis.  She was advised to apply Aquaphor at bedtime.  Areas are not erythematous or bothersome.    Follow-up  A follow-up visit is scheduled in 3 months to continue monitoring blood pressure.          Smoking Cessation:    Carlota RODRIGUEZ  reports that she has never smoked. She has never been exposed to tobacco smoke. She has never used smokeless tobacco.             Follow Up   Return in about 2 months (around 10/7/2024) for Next scheduled follow up.  Patient was given instructions and counseling regarding her condition or for health maintenance advice. Please see specific information pulled into the AVS if appropriate.     Please note that portions of this note were completed with a voice recognition program.    Patient or patient representative verbalized consent for the  use of Ambient Listening during the visit with  ARVIND Cruz for chart documentation. 8/7/2024  10:03 EDT

## 2024-09-30 RX ORDER — BUPROPION HYDROCHLORIDE 150 MG/1
150 TABLET ORAL DAILY
Qty: 90 TABLET | Refills: 1 | Status: SHIPPED | OUTPATIENT
Start: 2024-09-30

## 2024-10-02 DIAGNOSIS — I10 PRIMARY HYPERTENSION: ICD-10-CM

## 2024-10-02 DIAGNOSIS — R60.0 EDEMA OF BOTH FEET: ICD-10-CM

## 2024-10-02 DIAGNOSIS — R06.09 DYSPNEA ON EXERTION: ICD-10-CM

## 2024-10-02 RX ORDER — FUROSEMIDE 20 MG
20 TABLET ORAL DAILY
Qty: 30 TABLET | Refills: 2 | Status: SHIPPED | OUTPATIENT
Start: 2024-10-02

## 2024-10-18 ENCOUNTER — OFFICE VISIT (OUTPATIENT)
Dept: FAMILY MEDICINE CLINIC | Facility: CLINIC | Age: 59
End: 2024-10-18
Payer: COMMERCIAL

## 2024-10-18 VITALS
OXYGEN SATURATION: 97 % | HEART RATE: 92 BPM | HEIGHT: 66 IN | TEMPERATURE: 98.6 F | BODY MASS INDEX: 39.31 KG/M2 | DIASTOLIC BLOOD PRESSURE: 74 MMHG | SYSTOLIC BLOOD PRESSURE: 120 MMHG | WEIGHT: 244.6 LBS

## 2024-10-18 DIAGNOSIS — I10 PRIMARY HYPERTENSION: Primary | ICD-10-CM

## 2024-10-18 DIAGNOSIS — E66.01 CLASS 2 SEVERE OBESITY DUE TO EXCESS CALORIES WITH SERIOUS COMORBIDITY AND BODY MASS INDEX (BMI) OF 39.0 TO 39.9 IN ADULT: ICD-10-CM

## 2024-10-18 DIAGNOSIS — R63.5 WEIGHT GAIN: ICD-10-CM

## 2024-10-18 DIAGNOSIS — E66.812 CLASS 2 SEVERE OBESITY DUE TO EXCESS CALORIES WITH SERIOUS COMORBIDITY AND BODY MASS INDEX (BMI) OF 39.0 TO 39.9 IN ADULT: ICD-10-CM

## 2024-10-18 PROCEDURE — 99214 OFFICE O/P EST MOD 30 MIN: CPT

## 2024-10-18 RX ORDER — NALTREXONE HYDROCHLORIDE 50 MG/1
TABLET, FILM COATED ORAL
Qty: 30 TABLET | Refills: 2 | Status: SHIPPED | OUTPATIENT
Start: 2024-10-18

## 2024-10-18 RX ORDER — BUPROPION HYDROCHLORIDE 150 MG/1
300 TABLET ORAL DAILY
Qty: 180 TABLET | Refills: 1 | Status: SHIPPED | OUTPATIENT
Start: 2024-10-18

## 2024-10-18 NOTE — PROGRESS NOTES
Chief Complaint  Chief Complaint   Patient presents with    Hypertension    Follow-up    Weight Loss       Subjective      Carlota RODRIGUEZ presents to [facility]  History of Present Illness  The patient is a 59-year-old female who presents today to follow up on hypertension.    She reports that her blood pressure has been stable recently and she feels well overall. Previously, she had some elevated pressures at home, but her blood pressure was normal in the office. She continues to take losartan and Lasix as prescribed.    She has expressed interest in weight loss and has previously discussed the use of Ozempic for this purpose. However, her insurance does not cover this medication. She has tried various weight loss methods in the past, including Weight [employer] and meal delivery services, but found them unsustainable due to persistent hunger or lack of progress. Currently, she works from home and tries to incorporate physical activity into her day by walking every two hours. She is unable to take Topamax due to a previous adverse reaction.    She is currently taking Wellbutrin and reports feeling mentally stable. She has expressed a desire to eventually discontinue all medications, including Wellbutrin and her blood pressure medication.    IMMUNIZATIONS  She has received influenza vaccine.      Objective     Medical History:  Past Medical History:   Diagnosis Date    Anemia     Anxiety     Asthma     Cholelithiasis     Had Gallbladder Removed    CPAP (continuous positive airway pressure) dependence     Depression     Eating disorder     Overweight    H/O Lymphedema     Left arm and leg    History of anemia     History of foreign travel     April and Luke    History of hypoglycemia     History of vitamin D deficiency     Hypertension     Knee meniscus pain, left     Migraine     OAB (overactive bladder)     Obesity     RICHARD (obstructive sleep apnea)     Poor vision      Past  Surgical History:   Procedure Laterality Date     SECTION      CHOLECYSTECTOMY      COLONOSCOPY N/A 2023    Procedure: COLONOSCOPY with stool collection;  Surgeon: Leobardo Hodgson MD;  Location: [facility] ENDOSCOPY;  Service: General;  Laterality: N/A;  internal hemmorhoids    DILATATION AND CURETTAGE      ENDOMETRIAL ABLATION      HEMORRHOIDECTOMY  2006    LYMPH NODE DISSECTION      NECK SURGERY  1964    ROTATOR CUFF REPAIR Right 2010    SHOULDER SURGERY Left 2018      Social History     Tobacco Use    Smoking status: Never     Passive exposure: Never    Smokeless tobacco: Never   Vaping Use    Vaping status: Never Used   Substance Use Topics    Alcohol use: No    Drug use: No     Family History   Problem Relation Age of Onset    Clotting disorder Father     Hearing loss Father         Wore a Hearing Aid    Diabetes Mother     Skin cancer Mother     Anxiety disorder Mother         Anxiety    Arthritis Mother         Rheumatoid    Asthma Mother     Depression Mother     Hyperlipidemia Mother     Hearing loss Mother         Wears a Hearing Aid    Hypertension Mother     Anxiety disorder Maternal Grandmother         Anxiety    Diabetes Maternal Aunt     Breast cancer Maternal Aunt     Breast cancer Maternal Aunt     Anxiety disorder Maternal Aunt         Anxiety    Diabetes Maternal Aunt     Hyperlipidemia Maternal Aunt     Cancer Maternal Aunt         Breast    Anxiety disorder Maternal Aunt         Anxiety    Hyperlipidemia Maternal Aunt     Uterine cancer Maternal Aunt     Cancer Maternal Aunt         Generalized    Hyperlipidemia Maternal Uncle     Diabetes Other     Hyperlipidemia Other     Hypertension Other     Lung cancer Other     Diabetes Paternal Grandmother        Medications:  Prior to Admission medications    Medication Sig Start Date End Date Taking? Authorizing Provider   buPROPion XL (WELLBUTRIN XL) 150 MG 24 hr tablet TAKE 1 TABLET BY MOUTH DAILY 24  Yes Betzy Cabral  "ARVIND Ramesh   furosemide (LASIX) 20 MG tablet TAKE 1 TABLET BY MOUTH DAILY 10/2/24  Yes Betzy Cabral APRN   hydrocortisone 2.5 % cream Apply 1 application  topically to the appropriate area as directed 2 (Two) Times a Day.  Patient taking differently: Apply 1 Application topically to the appropriate area as directed As Needed. 11/17/23  Yes Bernardino Boland MD   losartan (COZAAR) 25 MG tablet Take 1 tablet by mouth Daily for 180 days. 5/1/24 10/28/24 Yes Betzy Cabral APRN   losartan (COZAAR) 50 MG tablet Take 1 tablet by mouth Daily for 180 days. 5/1/24 10/28/24 Yes Betzy Cabral APRN   temazepam (RESTORIL) 15 MG capsule Take 1 capsule by mouth Every Night. 6/6/24  Yes Betzy Cabral APRN   triamcinolone (KENALOG) 0.1 % ointment APPLY TOPICALLY TO THE AFFECTED AREA TWICE DAILY AS DIRECTED 3/21/24  Yes Yue Pena, RICHARD   lisinopril (PRINIVIL,ZESTRIL) 10 MG tablet Take 1 tablet by mouth Daily. 8/24/21 5/9/22  Ruchi Bradford APRN        Allergies:   Codeine, Demerol [meperidine], Hydrocodone-acetaminophen, Percocet [oxycodone-acetaminophen], Topiramate, Tramadol, and Tylenol [acetaminophen]    Health Maintenance Due   Topic Date Due    ZOSTER VACCINE (2 of 3) 08/01/2014    COVID-19 Vaccine (1 - 2023-24 season) Never done         Vital Signs:   /74   Pulse 92   Temp 98.6 °F (37 °C)   Ht 167.6 cm (65.98\")   Wt 111 kg (244 lb 9.6 oz)   SpO2 97%   BMI 39.50 kg/m²     Wt Readings from Last 3 Encounters:   10/18/24 111 kg (244 lb 9.6 oz)   08/07/24 109 kg (239 lb 4.8 oz)   07/05/24 111 kg (245 lb 3.2 oz)     BP Readings from Last 3 Encounters:   10/18/24 120/74   08/07/24 118/74   07/05/24 112/76         Physical Exam  Vitals reviewed.   Constitutional:       Appearance: Normal appearance. She is well-developed. She is obese.   HENT:      Head: Normocephalic and atraumatic.   Eyes:      Conjunctiva/sclera: Conjunctivae normal.      Pupils: Pupils are equal, " round, and reactive to light.   Cardiovascular:      Rate and Rhythm: Normal rate and regular rhythm.   Pulmonary:      Effort: Pulmonary effort is normal.      Breath sounds: Normal breath sounds.   Skin:     General: Skin is warm and dry.   Neurological:      Mental Status: She is alert and oriented to person, place, and time.   Psychiatric:         Mood and Affect: Mood and affect normal.         Behavior: Behavior normal.         Thought Content: Thought content normal.         Judgment: Judgment normal.       Physical Exam  Vital Signs  Blood pressure reading today is 120/74.      Result Review :    The following data was reviewed by ARVIND Cruz on 10/18/24 at 10:20 EDT:        Mammo Screening Digital Tomosynthesis Bilateral With CAD    Result Date: 6/21/2024  Benign findings. No mammographic evidence of malignancy. Recommend routine mammographic screening.  BI-RADS ASSESSMENT: BI-RADS 2. Benign findings.  The patient's information is entered into a computerized reminder system with a targeted due date for the next mammogram.  Note:  It has been reported that there is approximately a 15% false negative rate in mammography.  Therefore, management of a palpable abnormality should not be deferred because of a negative mammogram.     Electronically Signed By-JUAN HAMPTON MD On:6/21/2024 3:17 PM        Results                 Assessment and Plan    Diagnoses and all orders for this visit:    1. Primary hypertension (Primary)    2. Weight gain  -     buPROPion XL (WELLBUTRIN XL) 150 MG 24 hr tablet; Take 2 tablets by mouth Daily.  Dispense: 180 tablet; Refill: 1  -     naltrexone (DEPADE) 50 MG tablet; Take 1/2 tablet by mouth daily for 7 days, then take 1/2 tablet by mouth twice daily.  Dispense: 30 tablet; Refill: 2    3. Class 2 severe obesity due to excess calories with serious comorbidity and body mass index (BMI) of 39.0 to 39.9 in adult  -     buPROPion XL (WELLBUTRIN XL) 150 MG 24 hr tablet;  Take 2 tablets by mouth Daily.  Dispense: 180 tablet; Refill: 1  -     naltrexone (DEPADE) 50 MG tablet; Take 1/2 tablet by mouth daily for 7 days, then take 1/2 tablet by mouth twice daily.  Dispense: 30 tablet; Refill: 2       Assessment & Plan  1. Hypertension.  Blood pressure is currently well-controlled at 120/74. She reports that her blood pressure has stabilized without any changes in her medication regimen. Continue current medications, losartan and Lasix, as prescribed.    2. Weight management.  Her current regimen includes Wellbutrin 150 mg once daily, which may have an appetite-suppressing effect. The potential benefits of Contrave, a combination of Wellbutrin and naltrexone, were discussed. It was clarified that the increase in Wellbutrin dosage is intended for appetite suppression and weight loss, not mental health. A prescription for Wellbutrin 150 mg twice daily and naltrexone 25 mg twice daily (by splitting a 50 mg tablet) will be provided. The importance of physical activity and dietary control was emphasized. She was advised to use the [employer] marcos for tracking her diet. A 12-week course of Contrave will be initiated. If weight loss is observed within this period, the treatment will be continued; otherwise, it will be discontinued and alternative options will be explored. If any adverse effects are experienced with the increased Wellbutrin dosage, she should revert to the original dosage and contact the office for further guidance.    3. Medication Management.  She expressed a desire to eventually discontinue Wellbutrin and blood pressure medications but acknowledges the necessity of these medications for now.    4. Health Maintenance.  She was advised to schedule an appointment for the Shingrix vaccine, which is a two-dose series.    Follow-up  Return in 3 months for follow up.          Smoking Cessation:    Carlota RODRIGUEZ  reports that she has never smoked. She has never been exposed to  tobacco smoke. She has never used smokeless tobacco.             Follow Up   Return in about 3 months (around 1/18/2025) for Next scheduled follow up.  Patient was given instructions and counseling regarding her condition or for health maintenance advice. Please see specific information pulled into the AVS if appropriate.     Please note that portions of this note were completed with a voice recognition program.    Patient or patient representative verbalized consent for the use of Ambient Listening during the visit with  ARVIND Cruz for chart documentation. 10/18/2024  10:20 EDT

## 2024-10-23 ENCOUNTER — TELEPHONE (OUTPATIENT)
Dept: FAMILY MEDICINE CLINIC | Facility: CLINIC | Age: 59
End: 2024-10-23
Payer: COMMERCIAL

## 2024-10-23 NOTE — TELEPHONE ENCOUNTER
Caller:  IDA ATULCECILIA     Relationship:   SELF     Best call back number:   [phone]     Who are you requesting to speak with (clinical staff, provider,  specific staff member):   CLINICAL     What was the call regarding:  Patient would like an update on the Prior Auth for her increases dose of Wellbutrin.  On the other prescription, Naltrexone, she is saying that she doesn't recall Betzy Cabral saying anything about taking it twice a day. Wants to confirm that dosage please.

## 2024-10-24 NOTE — TELEPHONE ENCOUNTER
Spoke with patient and let her know I will work on her PA and I let her know the instructions for the naltrexone.

## 2024-10-25 DIAGNOSIS — E66.812 CLASS 2 SEVERE OBESITY DUE TO EXCESS CALORIES WITH SERIOUS COMORBIDITY AND BODY MASS INDEX (BMI) OF 39.0 TO 39.9 IN ADULT: Primary | ICD-10-CM

## 2024-10-25 DIAGNOSIS — E66.01 CLASS 2 SEVERE OBESITY DUE TO EXCESS CALORIES WITH SERIOUS COMORBIDITY AND BODY MASS INDEX (BMI) OF 39.0 TO 39.9 IN ADULT: Primary | ICD-10-CM

## 2024-10-25 DIAGNOSIS — F32.A DEPRESSION, UNSPECIFIED DEPRESSION TYPE: ICD-10-CM

## 2024-10-25 DIAGNOSIS — R63.5 WEIGHT GAIN: ICD-10-CM

## 2024-10-25 RX ORDER — BUPROPION HYDROCHLORIDE 150 MG/1
150 TABLET, EXTENDED RELEASE ORAL 2 TIMES DAILY
Qty: 60 TABLET | Refills: 2 | Status: SHIPPED | OUTPATIENT
Start: 2024-10-25

## 2024-12-28 DIAGNOSIS — R63.5 WEIGHT GAIN: ICD-10-CM

## 2024-12-28 DIAGNOSIS — F32.A DEPRESSION, UNSPECIFIED DEPRESSION TYPE: ICD-10-CM

## 2024-12-28 DIAGNOSIS — E66.01 CLASS 2 SEVERE OBESITY DUE TO EXCESS CALORIES WITH SERIOUS COMORBIDITY AND BODY MASS INDEX (BMI) OF 39.0 TO 39.9 IN ADULT: ICD-10-CM

## 2024-12-28 DIAGNOSIS — E66.812 CLASS 2 SEVERE OBESITY DUE TO EXCESS CALORIES WITH SERIOUS COMORBIDITY AND BODY MASS INDEX (BMI) OF 39.0 TO 39.9 IN ADULT: ICD-10-CM

## 2024-12-30 RX ORDER — BUPROPION HYDROCHLORIDE 150 MG/1
150 TABLET, EXTENDED RELEASE ORAL 2 TIMES DAILY
Qty: 60 TABLET | Refills: 2 | Status: SHIPPED | OUTPATIENT
Start: 2024-12-30

## 2025-01-02 DIAGNOSIS — I10 PRIMARY HYPERTENSION: ICD-10-CM

## 2025-01-02 DIAGNOSIS — R60.0 EDEMA OF BOTH FEET: ICD-10-CM

## 2025-01-02 DIAGNOSIS — R06.09 DYSPNEA ON EXERTION: ICD-10-CM

## 2025-01-02 RX ORDER — FUROSEMIDE 20 MG/1
20 TABLET ORAL DAILY
Qty: 30 TABLET | Refills: 2 | Status: SHIPPED | OUTPATIENT
Start: 2025-01-02

## 2025-01-04 DIAGNOSIS — F51.04 CHRONIC INSOMNIA: ICD-10-CM

## 2025-01-07 RX ORDER — TEMAZEPAM 15 MG/1
15 CAPSULE ORAL NIGHTLY
Qty: 30 CAPSULE | Refills: 0 | Status: SHIPPED | OUTPATIENT
Start: 2025-01-07

## 2025-01-07 NOTE — TELEPHONE ENCOUNTER
Controlled Medication Refill Request:    1.  Last Office Visit:  10/18/2024    2.  Next Office Visit:  1/23/2025     3.  Last UDS Date:  06/06/2024    4.  Last Consent Signed:  06/06/20024    5.  Medication Requested: Restoril (Temazepam)    Pharmacy:   [id number] - [place], [place] - [address] AT [facility] OF [address]/[facility] & [place] - [phone] [facility] [phone]   [address]  [facility] [address]  Phone: [phone] Fax: [phone]

## 2025-01-08 RX ORDER — LOSARTAN POTASSIUM 25 MG/1
25 TABLET ORAL DAILY
Qty: 90 TABLET | Refills: 1 | Status: SHIPPED | OUTPATIENT
Start: 2025-01-08

## 2025-01-09 RX ORDER — LOSARTAN POTASSIUM 50 MG/1
50 TABLET ORAL DAILY
Qty: 90 TABLET | Refills: 1 | OUTPATIENT
Start: 2025-01-09

## 2025-01-23 ENCOUNTER — TELEPHONE (OUTPATIENT)
Dept: FAMILY MEDICINE CLINIC | Facility: CLINIC | Age: 60
End: 2025-01-23

## 2025-01-23 ENCOUNTER — OFFICE VISIT (OUTPATIENT)
Dept: FAMILY MEDICINE CLINIC | Facility: CLINIC | Age: 60
End: 2025-01-23
Payer: COMMERCIAL

## 2025-01-23 VITALS
SYSTOLIC BLOOD PRESSURE: 120 MMHG | OXYGEN SATURATION: 98 % | WEIGHT: 235.9 LBS | BODY MASS INDEX: 37.91 KG/M2 | HEIGHT: 66 IN | HEART RATE: 81 BPM | TEMPERATURE: 98.5 F | DIASTOLIC BLOOD PRESSURE: 84 MMHG

## 2025-01-23 DIAGNOSIS — E66.812 CLASS 2 SEVERE OBESITY DUE TO EXCESS CALORIES WITH SERIOUS COMORBIDITY AND BODY MASS INDEX (BMI) OF 39.0 TO 39.9 IN ADULT: ICD-10-CM

## 2025-01-23 DIAGNOSIS — I10 PRIMARY HYPERTENSION: Primary | ICD-10-CM

## 2025-01-23 DIAGNOSIS — F51.04 CHRONIC INSOMNIA: ICD-10-CM

## 2025-01-23 DIAGNOSIS — E66.01 CLASS 2 SEVERE OBESITY DUE TO EXCESS CALORIES WITH SERIOUS COMORBIDITY AND BODY MASS INDEX (BMI) OF 39.0 TO 39.9 IN ADULT: ICD-10-CM

## 2025-01-23 PROCEDURE — 99214 OFFICE O/P EST MOD 30 MIN: CPT

## 2025-01-23 RX ORDER — TEMAZEPAM 7.5 MG/1
7.5 CAPSULE ORAL NIGHTLY PRN
Qty: 60 CAPSULE | Refills: 0 | Status: SHIPPED | OUTPATIENT
Start: 2025-01-23

## 2025-01-23 RX ORDER — LOSARTAN POTASSIUM 50 MG/1
50 TABLET ORAL DAILY
Qty: 90 TABLET | Refills: 1 | Status: SHIPPED | OUTPATIENT
Start: 2025-01-23 | End: 2025-07-22

## 2025-01-23 NOTE — PROGRESS NOTES
Chief Complaint  Chief Complaint   Patient presents with    Hypertension    Obesity    Insomnia       Subjective      Carlota RODRIGUEZ presents to [facility]  History of Present Illness  The patient is a 59-year-old female who presents today to follow up on hypertension and obesity. She had been prescribed naltrexone and Wellbutrin. The Wellbutrin was for her mental health as well as appetite suppression. She was taking 150 mg of Wellbutrin twice daily and has increased that to just once daily. She has stopped the naltrexone as she felt that it was not effective for suppressing her appetite. She also would like to discuss her insomnia. She currently takes temazepam 50 mg nightly and is still having trouble sleeping.    She reports an improvement in her edema, which typically manifests late in the evening following prolonged periods of standing. She expresses a desire to reduce her blood pressure in the future. She owns a blood pressure cuff and monitors her blood pressure intermittently, noting that it is usually within normal limits. She does not experience any adverse effects such as dizziness or lightheadedness when taking losartan 75 mg. She is currently on losartan 75 mg daily and Lasix 20 mg daily.    She experienced a weight loss of 10 pounds following a COVID-19 infection approximately 1 month ago but has since regained 4 to 5 pounds. She discontinued naltrexone after a 2-week trial due to a lack of noticeable effects and occasional increased hunger. She was previously on a 24-hour capsule of Wellbutrin, which was later switched to a twice-daily regimen. However, she often forgets the afternoon dose and does not feel unwell without it. She is considering discontinuing Wellbutrin and plans to gradually taper the dose over a 6-week period.    Despite nightly use of temazepam, she continues to experience sleep disturbances, with some nights characterized by complete insomnia and  others by only 4 hours of sleep. Occasionally, she manages to sleep through the night. She is considering increasing the dosage of temazepam or doubling the quantity prescribed to monitor her sleep patterns. She has previously tried Ambien, trazodone, clonidine, and melatonin, but found them ineffective. She has been on temazepam for an extended period, initially prescribed by a sleep specialist. She expresses a desire to eventually discontinue all sleep medications.    She is contemplating receiving a COVID-19 vaccine, having contracted the virus twice before. She has never received a COVID-19 vaccine. She has received her influenza vaccine and is aware of the need for a Shingrix vaccine.    IMMUNIZATIONS  She has received her influenza vaccine.      Objective     Medical History:  Past Medical History:   Diagnosis Date    Anemia     Anxiety     Asthma     Cholelithiasis     Had Gallbladder Removed    CPAP (continuous positive airway pressure) dependence     Depression     Eating disorder     Overweight    H/O Lymphedema     Left arm and leg    History of anemia     History of foreign travel     April and Luke    History of hypoglycemia     History of vitamin D deficiency     Hypertension     Knee meniscus pain, left     Migraine     OAB (overactive bladder)     Obesity     RICHARD (obstructive sleep apnea)     Poor vision      Past Surgical History:   Procedure Laterality Date     SECTION      CHOLECYSTECTOMY      COLONOSCOPY N/A 2023    Procedure: COLONOSCOPY with stool collection;  Surgeon: Leobardo Hodgson MD;  Location: [facility] ENDOSCOPY;  Service: General;  Laterality: N/A;  internal hemmorhoids    DILATATION AND CURETTAGE      ENDOMETRIAL ABLATION      HEMORRHOIDECTOMY  2006    LYMPH NODE DISSECTION      NECK SURGERY  1964    ROTATOR CUFF REPAIR Right 2010    SHOULDER SURGERY Left 2018      Social History     Tobacco Use    Smoking status: Never     Passive exposure: Never    Smokeless  tobacco: Never   Vaping Use    Vaping status: Never Used   Substance Use Topics    Alcohol use: No    Drug use: No     Family History   Problem Relation Age of Onset    Clotting disorder Father     Hearing loss Father         Wore a Hearing Aid    Diabetes Mother     Skin cancer Mother     Anxiety disorder Mother         Anxiety    Arthritis Mother         Rheumatoid    Asthma Mother     Depression Mother     Hyperlipidemia Mother     Hearing loss Mother         Wears a Hearing Aid    Hypertension Mother     Anxiety disorder Maternal Grandmother         Anxiety    Diabetes Maternal Aunt     Breast cancer Maternal Aunt     Breast cancer Maternal Aunt     Anxiety disorder Maternal Aunt         Anxiety    Diabetes Maternal Aunt     Hyperlipidemia Maternal Aunt     Cancer Maternal Aunt         Breast    Anxiety disorder Maternal Aunt         Anxiety    Hyperlipidemia Maternal Aunt     Uterine cancer Maternal Aunt     Cancer Maternal Aunt         Generalized    Hyperlipidemia Maternal Uncle     Diabetes Other     Hyperlipidemia Other     Hypertension Other     Lung cancer Other     Diabetes Paternal Grandmother        Medications:  Prior to Admission medications    Medication Sig Start Date End Date Taking? Authorizing Provider   buPROPion SR (WELLBUTRIN SR) 150 MG 12 hr tablet TAKE 1 TABLET BY MOUTH TWICE DAILY 12/30/24  Yes Betzy Cabral APRN   furosemide (LASIX) 20 MG tablet TAKE 1 TABLET BY MOUTH DAILY 1/2/25  Yes Betzy Cabral APRN   losartan (COZAAR) 25 MG tablet TAKE 1 TABLET BY MOUTH DAILY 1/8/25  Yes Betzy Cabral APRN   temazepam (RESTORIL) 15 MG capsule TAKE 1 CAPSULE BY MOUTH EVERY NIGHT 1/7/25  Yes Betzy Cabral APRN   losartan (COZAAR) 50 MG tablet Take 1 tablet by mouth Daily for 180 days. 5/1/24 12/13/24  Betzy Cabral APRN   promethazine-dextromethorphan (PROMETHAZINE-DM) 6.25-15 MG/5ML syrup Take 1 teaspoon each 4 to 6 hours as needed for cough.  Be  "aware that the medication can make you drowsy. 12/13/24   Zarina Huerta APRN   lisinopril (PRINIVIL,ZESTRIL) 10 MG tablet Take 1 tablet by mouth Daily. 8/24/21 5/9/22  Ruchi Bradford APRN        Allergies:   Codeine, Demerol [meperidine], Hydrocodone-acetaminophen, Percocet [oxycodone-acetaminophen], Topiramate, Tramadol, and Tylenol [acetaminophen]    Health Maintenance Due   Topic Date Due    ZOSTER VACCINE (2 of 3) 08/01/2014    COVID-19 Vaccine (1 - 2024-25 season) Never done         Vital Signs:   /84 (BP Location: Left arm, Patient Position: Sitting, Cuff Size: Adult)   Pulse 81   Temp 98.5 °F (36.9 °C) (Oral)   Ht 167.6 cm (66\")   Wt 107 kg (235 lb 14.4 oz)   SpO2 98%   BMI 38.08 kg/m²     Wt Readings from Last 3 Encounters:   01/23/25 107 kg (235 lb 14.4 oz)   12/13/24 109 kg (240 lb 6.4 oz)   10/18/24 111 kg (244 lb 9.6 oz)     BP Readings from Last 3 Encounters:   01/23/25 120/84   12/13/24 135/88   10/18/24 120/74     Physical Exam  Vitals reviewed.   Constitutional:       Appearance: Normal appearance. She is well-developed. She is obese.   HENT:      Head: Normocephalic and atraumatic.   Eyes:      Conjunctiva/sclera: Conjunctivae normal.      Pupils: Pupils are equal, round, and reactive to light.   Cardiovascular:      Rate and Rhythm: Normal rate and regular rhythm.      Heart sounds: No murmur heard.     No friction rub. No gallop.   Pulmonary:      Effort: Pulmonary effort is normal.      Breath sounds: Normal breath sounds. No wheezing or rhonchi.   Abdominal:      General: Bowel sounds are normal. There is no distension.      Palpations: Abdomen is soft.      Tenderness: There is no abdominal tenderness.   Skin:     General: Skin is warm and dry.   Neurological:      Mental Status: She is alert and oriented to person, place, and time.      Cranial Nerves: No cranial nerve deficit.   Psychiatric:         Mood and Affect: Mood and affect normal.         Behavior: Behavior " normal.         Thought Content: Thought content normal.         Judgment: Judgment normal.       Physical Exam  Lungs were auscultated.  Heart sounds are normal.    Vital Signs  Blood pressure is appropriate today at 120/84.      Result Review :    The following data was reviewed by ARVIND Cruz on 01/23/25 at 12:19 EST:    Common labs          6/7/2024    12:55   Common Labs   Glucose 92    BUN 14    Creatinine 0.96    Sodium 139    Potassium 4.0    Chloride 107    Calcium 9.2    Albumin 4.2    Total Bilirubin 0.3    Alkaline Phosphatase 119    AST (SGOT) 19    ALT (SGPT) 24    WBC 4.88    Hemoglobin 12.9    Hematocrit 39.3    Platelets 271    Total Cholesterol 204    Triglycerides 130    HDL Cholesterol 65    LDL Cholesterol  116        No Images in the past 120 days found..    Results                 Assessment and Plan    Diagnoses and all orders for this visit:    1. Primary hypertension (Primary)  -     losartan (COZAAR) 50 MG tablet; Take 1 tablet by mouth Daily for 180 days.  Dispense: 90 tablet; Refill: 1    2. Chronic insomnia  -     temazepam (RESTORIL) 7.5 MG capsule; Take 1 capsule by mouth At Night As Needed for Sleep.  Dispense: 60 capsule; Refill: 0    3. Class 2 severe obesity due to excess calories with serious comorbidity and body mass index (BMI) of 39.0 to 39.9 in adult       Assessment & Plan  1. Hypertension.  Her blood pressure readings have been consistently within the normal range. She will maintain her current medication regimen, which includes losartan 75 mg daily and Lasix 20 mg daily. A refill for losartan 50 mg has been provided. If she experiences symptoms indicative of low blood pressure, the dosage of losartan may be reduced to 50 mg.    2. Obesity.  She has discontinued the use of naltrexone due to perceived ineffectiveness in appetite suppression. She has also reduced her Wellbutrin intake from twice daily to once daily. She will continue with the current dosage of  Wellbutrin 150 mg once daily.    3. Insomnia.  She has been experiencing difficulty sleeping despite taking temazepam 50 mg nightly. A prescription for QUVIVIQ has been issued. She will continue to take temazepam 7.5 mg as needed while gradually reducing the dosage.    4. Health Maintenance.  She is advised to receive her COVID-19 vaccine in 2 to 4 weeks, followed by the RSV vaccine. She is also recommended to get the Shingrix vaccine, which is a 2-dose series.          Smoking Cessation:    Carlota RODRIGUEZ  reports that she has never smoked. She has never been exposed to tobacco smoke. She has never used smokeless tobacco.             Follow Up   Return in about 2 months (around 3/23/2025) for Next scheduled follow up.  Patient was given instructions and counseling regarding her condition or for health maintenance advice. Please see specific information pulled into the AVS if appropriate.     Please note that portions of this note were completed with a voice recognition program.    Patient or patient representative verbalized consent for the use of Ambient Listening during the visit with  ARVIND Cruz for chart documentation. 1/23/2025  12:19 EST

## 2025-01-23 NOTE — TELEPHONE ENCOUNTER
I spoke with pharmacist and let them know patient was on temazepam 15 mg but had an appt today and it has been decreased to 7.5 mg.

## 2025-01-30 ENCOUNTER — TELEPHONE (OUTPATIENT)
Dept: FAMILY MEDICINE CLINIC | Facility: CLINIC | Age: 60
End: 2025-01-30

## 2025-01-30 NOTE — TELEPHONE ENCOUNTER
Caller: Carlota RODRIGUEZ    Relationship: Self    Best call back number: [phone]    Which medication are you concerned about: [facility]    Who prescribed you this medication: LENORA CAMPA     When did you start taking this medication: HAVEN'T YET    What are your concerns: HAS NOT HEARD ANYTHING ON THIS MEDICATION AND CALLED [facility] AND THEY HAVE NO ORDERS FROM THE DOCTOR YET ON THIS.  WANTED TO CHECK AND SEE WHERE THINGS STOOD.

## 2025-01-30 NOTE — TELEPHONE ENCOUNTER
Hello, I see your last note stated you were prescribing Quviviq but I don't see it was ever sent. Please advise.

## 2025-01-31 DIAGNOSIS — F51.04 CHRONIC INSOMNIA: Primary | ICD-10-CM

## 2025-01-31 NOTE — TELEPHONE ENCOUNTER
Betzy Misha stated she sent the medication over today for the patient. I attempted to call patient and left a voicemail with this information. The specialty pharmacy should be reaching out to her to discuss delivery.

## 2025-02-06 ENCOUNTER — PRIOR AUTHORIZATION (OUTPATIENT)
Dept: FAMILY MEDICINE CLINIC | Facility: CLINIC | Age: 60
End: 2025-02-06
Payer: COMMERCIAL

## 2025-02-06 NOTE — TELEPHONE ENCOUNTER
Quviviq 50MG tablets [place] APPROVAL     PA Case ID #: [id number]  Rx #: [id number]  Need Help? Call us at [phone]  Outcome  Approved today by [employer] 2017  PA Case: [id number], Status: Approved, Coverage Starts on: 2/6/2025 12:00:00 AM, Coverage Ends on: 2/6/2026 12:00:00 AM.  Authorization Expiration Date: 2/5/2026

## 2025-04-06 DIAGNOSIS — I10 PRIMARY HYPERTENSION: ICD-10-CM

## 2025-04-06 DIAGNOSIS — R60.0 EDEMA OF BOTH FEET: ICD-10-CM

## 2025-04-06 DIAGNOSIS — R06.09 DYSPNEA ON EXERTION: ICD-10-CM

## 2025-04-07 RX ORDER — FUROSEMIDE 20 MG/1
20 TABLET ORAL DAILY
Qty: 30 TABLET | Refills: 2 | Status: SHIPPED | OUTPATIENT
Start: 2025-04-07

## 2025-04-09 ENCOUNTER — OFFICE VISIT (OUTPATIENT)
Dept: FAMILY MEDICINE CLINIC | Facility: CLINIC | Age: 60
End: 2025-04-09
Payer: COMMERCIAL

## 2025-04-09 VITALS
OXYGEN SATURATION: 98 % | DIASTOLIC BLOOD PRESSURE: 90 MMHG | TEMPERATURE: 98.1 F | BODY MASS INDEX: 39.25 KG/M2 | HEIGHT: 66 IN | HEART RATE: 84 BPM | WEIGHT: 244.2 LBS | SYSTOLIC BLOOD PRESSURE: 132 MMHG

## 2025-04-09 DIAGNOSIS — G47.8 NON-RESTORATIVE SLEEP: ICD-10-CM

## 2025-04-09 DIAGNOSIS — I10 PRIMARY HYPERTENSION: Primary | ICD-10-CM

## 2025-04-09 DIAGNOSIS — G47.33 OBSTRUCTIVE SLEEP APNEA SYNDROME: ICD-10-CM

## 2025-04-09 DIAGNOSIS — F51.04 CHRONIC INSOMNIA: ICD-10-CM

## 2025-04-09 PROCEDURE — 99214 OFFICE O/P EST MOD 30 MIN: CPT

## 2025-04-09 RX ORDER — LOSARTAN POTASSIUM 50 MG/1
50 TABLET ORAL DAILY
Qty: 90 TABLET | Refills: 1 | Status: SHIPPED | OUTPATIENT
Start: 2025-04-09 | End: 2025-10-06

## 2025-04-09 RX ORDER — TEMAZEPAM 7.5 MG/1
7.5 CAPSULE ORAL NIGHTLY PRN
Qty: 60 CAPSULE | Refills: 0 | Status: SHIPPED | OUTPATIENT
Start: 2025-04-09

## 2025-04-09 RX ORDER — LOSARTAN POTASSIUM 25 MG/1
25 TABLET ORAL DAILY
Qty: 90 TABLET | Refills: 1 | Status: SHIPPED | OUTPATIENT
Start: 2025-04-09

## 2025-04-09 NOTE — PROGRESS NOTES
Chief Complaint  Chief Complaint   Patient presents with    Hypertension    Insomnia     Pt states the Quviviq she was prescribed has not been helping her sleep.       Subjective      Carlota Patiño presents to [facility]  History of Present Illness  The patient is a 59-year-old female who presents today to follow up on hypertension and insomnia.    She has been on multiple sleep aids, including Ambien, trazodone, clonidine, melatonin, and most recently, QUVIVIQ. Today, she states that the new medication, QUVIVIQ, is also not effective. When last seen, the risks of increasing the temazepam and using it regularly were discussed. She has sleep apnea but does not use a machine. A sleep study was conducted a year ago, but she was not informed about the need for a machine. Years ago, she used a sleep apnea mask with low pressure (9) but found it uncomfortable and discontinued its use after a year. She has not trialed CPAP since the sleep study. She takes magnesium 200 mg, which helps her sleep most nights, but occasionally it is ineffective. On such nights, she takes temazepam 7.5 mg. She tried QUVIVIQ for about 2.5 weeks but found it unhelpful as it kept her awake until 2:00 or 3:00 AM. She requests a refill of temazepam.    She reports satisfactory control of her blood pressure, which she monitors at home, although her device is currently broken. Her readings have consistently been in the range of 130s/80s to 90s. She has reduced her losartan dosage from 75 mg to 50 mg daily without any adverse effects. She is currently taking losartan 50 mg daily and Lasix 20 mg daily.    She has discontinued Wellbutrin and naltrexone, initially prescribed for mental health and weight loss respectively, and reports feeling mentally stable. She expresses interest in exploring other weight loss options.    MEDICATIONS  Current: Losartan, Lasix, temazepam, magnesium.  Past: Ambien, trazodone, clonidine,  melatonin, Wellbutrin, naltrexone.      Objective     Medical History:  Past Medical History:   Diagnosis Date    Anemia     Anxiety     Asthma     Cholelithiasis     Had Gallbladder Removed    CPAP (continuous positive airway pressure) dependence     Depression     Eating disorder     Overweight    H/O Lymphedema     Left arm and leg    History of anemia     History of foreign travel     April and [place]    History of hypoglycemia     History of vitamin D deficiency     Hypertension     Knee meniscus pain, left     Migraine     OAB (overactive bladder)     Obesity     RICHARD (obstructive sleep apnea)     Poor vision      Past Surgical History:   Procedure Laterality Date     SECTION      CHOLECYSTECTOMY      COLONOSCOPY N/A 2023    Procedure: COLONOSCOPY with stool collection;  Surgeon: Leobardo Hodgson MD;  Location: [facility] ENDOSCOPY;  Service: General;  Laterality: N/A;  internal hemmorhoids    DILATATION AND CURETTAGE      ENDOMETRIAL ABLATION      HEMORRHOIDECTOMY  2006    LYMPH NODE DISSECTION      NECK SURGERY  1964    ROTATOR CUFF REPAIR Right 2010    SHOULDER SURGERY Left 2018      Social History     Tobacco Use    Smoking status: Never     Passive exposure: Never    Smokeless tobacco: Never   Vaping Use    Vaping status: Never Used   Substance Use Topics    Alcohol use: No    Drug use: No     Family History   Problem Relation Age of Onset    Clotting disorder Father     Hearing loss Father         Wore a Hearing Aid    Diabetes Mother     Skin cancer Mother     Anxiety disorder Mother         Anxiety    Arthritis Mother         Rheumatoid    Asthma Mother     Depression Mother     Hyperlipidemia Mother     Hearing loss Mother         Wears a Hearing Aid    Hypertension Mother     Anxiety disorder Maternal Grandmother         Anxiety    Diabetes Maternal Aunt     Breast cancer Maternal Aunt     Breast cancer Maternal Aunt     Anxiety disorder Maternal Aunt         Anxiety    Diabetes  "Maternal Aunt     Hyperlipidemia Maternal Aunt     Cancer Maternal Aunt         Breast    Anxiety disorder Maternal Aunt         Anxiety    Hyperlipidemia Maternal Aunt     Uterine cancer Maternal Aunt     Cancer Maternal Aunt         Generalized    Hyperlipidemia Maternal Uncle     Diabetes Other     Hyperlipidemia Other     Hypertension Other     Lung cancer Other     Diabetes Paternal Grandmother        Medications:  Prior to Admission medications    Medication Sig Start Date End Date Taking? Authorizing Provider   furosemide (LASIX) 20 MG tablet TAKE 1 TABLET BY MOUTH DAILY 4/7/25  Yes Betzy Cabral APRN   losartan (COZAAR) 50 MG tablet Take 1 tablet by mouth Daily for 180 days. 1/23/25 7/22/25 Yes Betzy Cabral APRN   Magnesium Citrate 100 MG chewable tablet Chew 2 each Every Night.   Yes Provider, MD Joyce   temazepam (RESTORIL) 7.5 MG capsule Take 1 capsule by mouth At Night As Needed for Sleep. 1/23/25  Yes Betzy Cabral APRN   buPROPion SR (WELLBUTRIN SR) 150 MG 12 hr tablet TAKE 1 TABLET BY MOUTH TWICE DAILY 12/30/24   Betzy Cabral APRN   Daridorexant HCl (QUVIVIQ) 50 MG tablet Take 1 tablet by mouth Every Night. 1/31/25   Betzy Cabral APRN   losartan (COZAAR) 25 MG tablet TAKE 1 TABLET BY MOUTH DAILY  Patient not taking: Reported on 4/9/2025 1/8/25   eBtzy Cabral APRN   lisinopril (PRINIVIL,ZESTRIL) 10 MG tablet Take 1 tablet by mouth Daily. 8/24/21 5/9/22  Ruchi Bradford APRN        Allergies:   Codeine, Demerol [meperidine], Hydrocodone-acetaminophen, Percocet [oxycodone-acetaminophen], Topiramate, Tramadol, and Tylenol [acetaminophen]    Health Maintenance Due   Topic Date Due    Pneumococcal Vaccine 50+ (1 of 1 - PCV) Never done         Vital Signs:   /90 (BP Location: Right arm, Patient Position: Sitting, Cuff Size: Adult)   Pulse 84   Temp 98.1 °F (36.7 °C) (Oral)   Ht 167.6 cm (66\")   Wt 111 kg (244 lb 3.2 oz)   SpO2 " 98%   BMI 39.41 kg/m²     Wt Readings from Last 3 Encounters:   04/09/25 111 kg (244 lb 3.2 oz)   01/23/25 107 kg (235 lb 14.4 oz)   12/13/24 109 kg (240 lb 6.4 oz)     BP Readings from Last 3 Encounters:   04/09/25 132/90   01/23/25 120/84   12/13/24 135/88       Class 2 Severe Obesity (BMI >=35 and <=39.9). Obesity-related health conditions include the following: obstructive sleep apnea, hypertension, and osteoarthritis. Obesity is unchanged. BMI is is above average; BMI management plan is completed. We discussed portion control, increasing exercise, pharmacologic options including GLP-1s, and an marcos-based approach such as Club Venit Pal or Lose It.       Physical Exam  Vitals reviewed.   Constitutional:       Appearance: Normal appearance. She is well-developed. She is obese.   HENT:      Head: Normocephalic and atraumatic.   Eyes:      Conjunctiva/sclera: Conjunctivae normal.      Pupils: Pupils are equal, round, and reactive to light.   Cardiovascular:      Rate and Rhythm: Normal rate and regular rhythm.      Heart sounds: No murmur heard.     No friction rub. No gallop.   Pulmonary:      Effort: Pulmonary effort is normal.      Breath sounds: Normal breath sounds. No wheezing or rhonchi.   Abdominal:      General: Bowel sounds are normal. There is no distension.      Palpations: Abdomen is soft.      Tenderness: There is no abdominal tenderness.   Skin:     General: Skin is warm and dry.   Neurological:      Mental Status: She is alert and oriented to person, place, and time.      Cranial Nerves: No cranial nerve deficit.   Psychiatric:         Mood and Affect: Mood and affect normal.         Behavior: Behavior normal.         Thought Content: Thought content normal.         Judgment: Judgment normal.       Physical Exam  Lungs are clear.  Heart sounds normal.    Vital Signs  Blood pressure reading is 132/90. BMI is 39.4.      Result Review :    The following data was reviewed by ARVIND Cruz  on 04/09/25 at 11:39 EDT:        No Images in the past 120 days found..    Results  Testing  Sleep study showed severe snoring and obstructive sleep apnea with lowest oxygen rate at 89% and a total of 363 episodes of snoring.               Assessment and Plan    Diagnoses and all orders for this visit:    1. Primary hypertension (Primary)  -     losartan (COZAAR) 50 MG tablet; Take 1 tablet by mouth Daily for 180 days.  Dispense: 90 tablet; Refill: 1  -     losartan (COZAAR) 25 MG tablet; Take 1 tablet by mouth Daily.  Dispense: 90 tablet; Refill: 1    2. Chronic insomnia  -     temazepam (RESTORIL) 7.5 MG capsule; Take 1 capsule by mouth At Night As Needed for Sleep.  Dispense: 60 capsule; Refill: 0    3. Non-restorative sleep  -     Ambulatory Referral to Sleep Medicine    4. Obstructive sleep apnea syndrome  -     Ambulatory Referral to Sleep Medicine       Assessment & Plan  1. Hypertension.  Her diastolic blood pressure is slightly elevated at 90 mmHg. She will resume her previous dosage of losartan 75 mg daily. A prescription refill for losartan has been provided.    2. Insomnia.  Her insomnia may be attributed to an underlying physiological condition, potentially sleep apnea. She has been on multiple sleep aids including Ambien, trazodone, clonidine, melatonin, and most recently QUVIVIQ, which have not been effective. She will increase her magnesium intake to 400 mg. A referral to Dr. Ames in sleep medicine has been made for further evaluation and management of her sleep apnea. She will continue to take temazepam 7.5 mg as needed, with the option to increase to 2 tablets if necessary. A prescription refill for temazepam has been provided.    3. Weight management.  She has previously tried Wellbutrin and naltrexone for weight loss. The use of phentermine is not recommended due to its stimulant properties and her existing sleep issues. She is unable to tolerate Topamax. She has been advised to utilize the  [employer] marcos for dietary tracking and accountability.          Smoking Cessation:    Carlota Patiño  reports that she has never smoked. She has never been exposed to tobacco smoke. She has never used smokeless tobacco.            Follow Up   Return in about 6 months (around 10/9/2025) for Next scheduled follow up.  Patient was given instructions and counseling regarding her condition or for health maintenance advice. Please see specific information pulled into the AVS if appropriate.     Please note that portions of this note were completed with a voice recognition program.    Patient or patient representative verbalized consent for the use of Ambient Listening during the visit with  ARVIND Cruz for chart documentation. 4/9/2025  09:12 EDT

## 2025-04-23 ENCOUNTER — OFFICE VISIT (OUTPATIENT)
Dept: SLEEP MEDICINE | Facility: HOSPITAL | Age: 60
End: 2025-04-23
Payer: COMMERCIAL

## 2025-04-23 VITALS
DIASTOLIC BLOOD PRESSURE: 80 MMHG | WEIGHT: 236 LBS | HEART RATE: 79 BPM | BODY MASS INDEX: 37.93 KG/M2 | OXYGEN SATURATION: 98 % | HEIGHT: 66 IN | SYSTOLIC BLOOD PRESSURE: 115 MMHG

## 2025-04-23 DIAGNOSIS — G47.33 OSA (OBSTRUCTIVE SLEEP APNEA): Primary | ICD-10-CM

## 2025-04-23 DIAGNOSIS — I10 ESSENTIAL HYPERTENSION, BENIGN: ICD-10-CM

## 2025-04-23 DIAGNOSIS — G47.8 NON-RESTORATIVE SLEEP: ICD-10-CM

## 2025-04-23 DIAGNOSIS — R06.83 SNORING: ICD-10-CM

## 2025-04-23 DIAGNOSIS — E66.812 CLASS 2 OBESITY: ICD-10-CM

## 2025-04-23 PROCEDURE — 99214 OFFICE O/P EST MOD 30 MIN: CPT | Performed by: INTERNAL MEDICINE

## 2025-04-23 PROCEDURE — G0463 HOSPITAL OUTPT CLINIC VISIT: HCPCS | Performed by: INTERNAL MEDICINE

## 2025-04-23 NOTE — PROGRESS NOTES
"  [facility]  Sleep Medicine   [address] [place] [address]  [place]   [place] [address]  Phone: [phone]  Fax: [phone]      SLEEP CLINIC FOLLOW UP PROGRESS NOTE.    Carlota Patiño  [id number]   1965  59 y.o.  female      PCP: Betzy Cabral APRN      Date of visit: 4/23/2025    Chief Complaint   Patient presents with    Sleep Apnea    Obesity       HPI:  This is a 59 y.o. years old patient is here for the management of obstructive sleep apnea.  Sleep apnea is mild in severity with a AHI of 12.8/hr. sleep study conducted on 28 February 2024.  She did not get the CPAP at that time because of her insurance was not covering CPAP's.  Now she has a new insurance [employer] and wants to pursue getting this CPAP.  She still has symptoms of snoring and daytime excessive sleepiness and fatigue along with she has a history of hypertension.  Normal bedtime, 9 PM  Normal wake time 7 AM    Medications and allergies are reviewed by me and documented in the encounter.     SOCIAL (habits pertaining to sleep medicine)  History tobacco use:No   History of alcohol use: 0 per week  Caffeine use: 0     REVIEW OF SYSTEMS:   Pertaining positive symptoms are:  Icard Sleepiness Scale :Total score: 2   Snoring      PHYSICAL EXAMINATION:  CONSTITUTIONAL:  Vitals:    04/23/25 1100   BP: 115/80   BP Location: Right arm   Patient Position: Sitting   Pulse: 79   SpO2: 98%   Weight: 107 kg (236 lb)   Height: 167.6 cm (65.98\")    Body mass index is 38.11 kg/m².   NOSE: nasal passages are clear, No deformities noted   RESP SYSTEM: Not in any respiratory distress, no chest deformities noted,   CARDIOVASULAR: No edema noted  NEURO: Oriented x 3, gait normal,  Mood and affect appeared appropriate      Order sent to [facility] for CPAP      ASSESSMENT AND PLAN:  Obstructive sleep apnea ( G 47.33).    Without proper control of sleep apnea and good compliance there is a increased risk for hypertension, diabetes mellitus " and nonrestorative sleep with hypersomnia which can increase risk for motor vehicle accidents.  Untreated sleep apnea is also a risk factor for development of atrial fibrillation, pulmonary hypertension, insulin resistance and stroke. The patient is also instructed to get the supplies from the [employer] company and and change them on a regular basis.  A prescription for supplies has been sent to the [employer] company.  I have also discussed the good sleep hygiene habits and adequate amount of sleep needed for good health.  Obesity  2 with BMI is Body mass index is 38.11 kg/m².. I have discuss the relationship between the weight and sleep apnea. The benefit of weight loss in reducing severity of sleep apnea was discussed. Discussed diet and exercise with the patient to achieve ideal BMI.  Hypertension.    Return for 31 to 90 days after PAP setup with down load. . Patient's questions were answered.    4/23/2025  Calr Darling MD  Sleep Medicine.  Medical Director,   [facility], [place] [facility] [place] [facility].

## 2025-07-19 DIAGNOSIS — R06.09 DYSPNEA ON EXERTION: ICD-10-CM

## 2025-07-19 DIAGNOSIS — I10 PRIMARY HYPERTENSION: ICD-10-CM

## 2025-07-19 DIAGNOSIS — R60.0 EDEMA OF BOTH FEET: ICD-10-CM

## 2025-07-21 RX ORDER — FUROSEMIDE 20 MG/1
20 TABLET ORAL DAILY
Qty: 90 TABLET | Refills: 0 | Status: SHIPPED | OUTPATIENT
Start: 2025-07-21 | End: 2025-07-24 | Stop reason: SDUPTHER

## 2025-07-24 DIAGNOSIS — I10 PRIMARY HYPERTENSION: ICD-10-CM

## 2025-07-24 DIAGNOSIS — R06.09 DYSPNEA ON EXERTION: ICD-10-CM

## 2025-07-24 DIAGNOSIS — R60.0 EDEMA OF BOTH FEET: ICD-10-CM

## 2025-07-24 RX ORDER — FUROSEMIDE 20 MG/1
20 TABLET ORAL DAILY
Qty: 90 TABLET | Refills: 0 | Status: SHIPPED | OUTPATIENT
Start: 2025-07-24

## 2025-08-06 ENCOUNTER — TRANSCRIBE ORDERS (OUTPATIENT)
Dept: ADMINISTRATIVE | Facility: HOSPITAL | Age: 60
End: 2025-08-06
Payer: COMMERCIAL

## 2025-08-06 ENCOUNTER — OFFICE VISIT (OUTPATIENT)
Dept: SLEEP MEDICINE | Facility: HOSPITAL | Age: 60
End: 2025-08-06
Payer: COMMERCIAL

## 2025-08-06 VITALS
WEIGHT: 252.5 LBS | HEART RATE: 90 BPM | SYSTOLIC BLOOD PRESSURE: 128 MMHG | DIASTOLIC BLOOD PRESSURE: 81 MMHG | HEIGHT: 66 IN | OXYGEN SATURATION: 97 % | BODY MASS INDEX: 40.58 KG/M2

## 2025-08-06 DIAGNOSIS — I10 ESSENTIAL HYPERTENSION, BENIGN: ICD-10-CM

## 2025-08-06 DIAGNOSIS — Z12.31 VISIT FOR SCREENING MAMMOGRAM: Primary | ICD-10-CM

## 2025-08-06 DIAGNOSIS — E66.813 CLASS 3 SEVERE OBESITY IN ADULT: ICD-10-CM

## 2025-08-06 DIAGNOSIS — G47.33 OSA ON CPAP: Primary | ICD-10-CM

## 2025-08-06 PROBLEM — E66.812 CLASS 2 OBESITY: Status: RESOLVED | Noted: 2024-01-29 | Resolved: 2025-08-06

## 2025-08-06 PROBLEM — R06.83 SNORING: Status: RESOLVED | Noted: 2024-01-29 | Resolved: 2025-08-06

## 2025-08-06 PROBLEM — F51.04 CHRONIC INSOMNIA: Status: RESOLVED | Noted: 2024-01-29 | Resolved: 2025-08-06

## 2025-08-06 PROBLEM — G47.8 NON-RESTORATIVE SLEEP: Status: RESOLVED | Noted: 2024-01-29 | Resolved: 2025-08-06

## 2025-08-06 PROCEDURE — G0463 HOSPITAL OUTPT CLINIC VISIT: HCPCS

## 2025-08-06 PROCEDURE — 99213 OFFICE O/P EST LOW 20 MIN: CPT | Performed by: INTERNAL MEDICINE

## 2025-08-15 ENCOUNTER — HOSPITAL ENCOUNTER (OUTPATIENT)
Dept: MAMMOGRAPHY | Facility: HOSPITAL | Age: 60
Discharge: HOME | End: 2025-08-15
Payer: COMMERCIAL

## 2025-08-15 DIAGNOSIS — Z12.31 VISIT FOR SCREENING MAMMOGRAM: ICD-10-CM

## 2025-08-15 PROCEDURE — 77063 BREAST TOMOSYNTHESIS BI: CPT

## 2025-08-15 PROCEDURE — 77067 SCR MAMMO BI INCL CAD: CPT

## 2025-08-20 DIAGNOSIS — I10 PRIMARY HYPERTENSION: ICD-10-CM

## 2025-08-21 RX ORDER — LOSARTAN POTASSIUM 25 MG/1
25 TABLET ORAL DAILY
Qty: 90 TABLET | Refills: 1 | Status: SHIPPED | OUTPATIENT
Start: 2025-08-21